# Patient Record
Sex: FEMALE | Race: WHITE | Employment: OTHER | URBAN - METROPOLITAN AREA
[De-identification: names, ages, dates, MRNs, and addresses within clinical notes are randomized per-mention and may not be internally consistent; named-entity substitution may affect disease eponyms.]

---

## 2020-03-10 ENCOUNTER — HOSPITAL ENCOUNTER (EMERGENCY)
Facility: CLINIC | Age: 85
Discharge: HOME | End: 2020-03-11
Attending: EMERGENCY MEDICINE | Admitting: EMERGENCY MEDICINE
Payer: MEDICARE

## 2020-03-10 DIAGNOSIS — I10 HYPERTENSION, UNSPECIFIED TYPE: ICD-10-CM

## 2020-03-10 LAB
BASOPHILS # BLD AUTO: 0 10E9/L (ref 0–0.2)
BASOPHILS NFR BLD AUTO: 0.4 %
DIFFERENTIAL METHOD BLD: NORMAL
EOSINOPHIL # BLD AUTO: 0.1 10E9/L (ref 0–0.7)
EOSINOPHIL NFR BLD AUTO: 0.9 %
ERYTHROCYTE [DISTWIDTH] IN BLOOD BY AUTOMATED COUNT: 13.3 % (ref 10–15)
HCT VFR BLD AUTO: 43.3 % (ref 35–47)
HGB BLD-MCNC: 14.7 G/DL (ref 11.7–15.7)
IMM GRANULOCYTES # BLD: 0 10E9/L (ref 0–0.4)
IMM GRANULOCYTES NFR BLD: 0.3 %
INTERPRETATION ECG - MUSE: NORMAL
LYMPHOCYTES # BLD AUTO: 1.9 10E9/L (ref 0.8–5.3)
LYMPHOCYTES NFR BLD AUTO: 25 %
MCH RBC QN AUTO: 32.2 PG (ref 26.5–33)
MCHC RBC AUTO-ENTMCNC: 33.9 G/DL (ref 31.5–36.5)
MCV RBC AUTO: 95 FL (ref 78–100)
MONOCYTES # BLD AUTO: 0.8 10E9/L (ref 0–1.3)
MONOCYTES NFR BLD AUTO: 10.8 %
NEUTROPHILS # BLD AUTO: 4.7 10E9/L (ref 1.6–8.3)
NEUTROPHILS NFR BLD AUTO: 62.6 %
NRBC # BLD AUTO: 0 10*3/UL
NRBC BLD AUTO-RTO: 0 /100
PLATELET # BLD AUTO: 212 10E9/L (ref 150–450)
RBC # BLD AUTO: 4.56 10E12/L (ref 3.8–5.2)
WBC # BLD AUTO: 7.4 10E9/L (ref 4–11)

## 2020-03-10 PROCEDURE — 99284 EMERGENCY DEPT VISIT MOD MDM: CPT

## 2020-03-10 PROCEDURE — 25000132 ZZH RX MED GY IP 250 OP 250 PS 637: Performed by: EMERGENCY MEDICINE

## 2020-03-10 PROCEDURE — 85025 COMPLETE CBC W/AUTO DIFF WBC: CPT | Performed by: EMERGENCY MEDICINE

## 2020-03-10 PROCEDURE — 80048 BASIC METABOLIC PNL TOTAL CA: CPT | Performed by: EMERGENCY MEDICINE

## 2020-03-10 PROCEDURE — 93005 ELECTROCARDIOGRAM TRACING: CPT

## 2020-03-10 RX ORDER — ACETAMINOPHEN 325 MG/1
650 TABLET ORAL ONCE
Status: COMPLETED | OUTPATIENT
Start: 2020-03-10 | End: 2020-03-10

## 2020-03-10 RX ADMIN — ACETAMINOPHEN 650 MG: 325 TABLET, FILM COATED ORAL at 21:56

## 2020-03-10 ASSESSMENT — ENCOUNTER SYMPTOMS
WEAKNESS: 0
NUMBNESS: 0
SHORTNESS OF BREATH: 0
DIARRHEA: 1
PALPITATIONS: 0
FEVER: 1
SPEECH DIFFICULTY: 0
HEADACHES: 1
LIGHT-HEADEDNESS: 1

## 2020-03-10 ASSESSMENT — MIFFLIN-ST. JEOR: SCORE: 999.73

## 2020-03-10 NOTE — ED AVS SNAPSHOT
Emergency Department  [address] [facility] [address]  Phone:  [phone]  Fax:  [phone]                                    Krupa Mora   MRN: [id number]    Department:   Emergency Department   Date of Visit:  3/10/2020           After Visit Summary Signature Page    I have received my discharge instructions, and my questions have been answered. I have discussed any challenges I see with this plan with the nurse or doctor.    ..........................................................................................................................................  Patient/Patient Representative Signature      ..........................................................................................................................................  Patient Representative Print Name and Relationship to Patient    ..................................................               ................................................  Date                                   Time    ..........................................................................................................................................  Reviewed by Signature/Title    ...................................................              ..............................................  Date                                               Time          22EPIC Rev 08/18

## 2020-03-11 VITALS
OXYGEN SATURATION: 99 % | BODY MASS INDEX: 22.68 KG/M2 | WEIGHT: 128 LBS | TEMPERATURE: 97.8 F | HEIGHT: 63 IN | HEART RATE: 104 BPM | DIASTOLIC BLOOD PRESSURE: 81 MMHG | SYSTOLIC BLOOD PRESSURE: 154 MMHG | RESPIRATION RATE: 16 BRPM

## 2020-03-11 LAB
ANION GAP SERPL CALCULATED.3IONS-SCNC: 6 MMOL/L (ref 3–14)
BUN SERPL-MCNC: 17 MG/DL (ref 7–30)
CALCIUM SERPL-MCNC: 9.6 MG/DL (ref 8.5–10.1)
CHLORIDE SERPL-SCNC: 108 MMOL/L (ref 94–109)
CO2 SERPL-SCNC: 26 MMOL/L (ref 20–32)
CREAT SERPL-MCNC: 0.72 MG/DL (ref 0.52–1.04)
GFR SERPL CREATININE-BSD FRML MDRD: 76 ML/MIN/{1.73_M2}
GLUCOSE SERPL-MCNC: 92 MG/DL (ref 70–99)
POTASSIUM SERPL-SCNC: 3.5 MMOL/L (ref 3.4–5.3)
SODIUM SERPL-SCNC: 140 MMOL/L (ref 133–144)

## 2020-03-11 NOTE — ED TRIAGE NOTES
"\"I feel flushed\" pt reports she started feeling flushed around 1800.  Pt reports 2 days ago she had diarrhea and then it went away. Pt feels pressure to her head.   "

## 2020-03-11 NOTE — ED PROVIDER NOTES
"  History     Chief Complaint:  Flushed      HPI   Krupa Mora is a 84 year old female who presents to the emergency department with her son for evaluation of \"feeling flushed\". Patient reports a sudden onset of pressure on both sides of her head, and feeling \"flushed\" starting 6pm today. Patient also reports intermittent lightheadedness, increased tinnitus, and slightly unsteady gait. She also notes one episode of diarrhea two days ago that has since resolved as she had a normal bowel movement this morning. Son reports that the patient's upstairs neighbor is currently remodeling and making lots of loud noises, which he thinks may be contributing to patient's anxiety.   The patient mentioned several times that she carried several bags of heavy groceries up to her apartment today, which was taxing but not exhausting.  She tries to be active by walking the halls over the winter, but notes she has gained 20 lbs since moving to [place] because of the difficulty in getting outside during the grey.  She denies eating much sodium in her diet.  She denies palpitations, chest pain, shortness of breath, numbness and weakness of her extremities, slurred speech, and vision changes.    Allergies:  Pneumococcal 23-Miranda Ps Vaccine    Medications:    Ipratropium  Atorvastatin  Lorazepam    Past Medical History:    Osteopenia  Insomnia  Anxiety  Depression  Hyperlipidemia  BCC  Melanoma  Dysthymic disorder    Past Surgical History:    Hemorrhoidectomy  Skin cancer removal    Family History:    Parkinsons - father  Uterine cancer - mother    Social History:  Smoking status: never smoker  Alcohol use: yes  Drug use: no  The patient presents to the emergency department her son.  PCP: Yadira Wilcox  Marital Status:       Review of Systems   Constitutional: Positive for fever.   HENT: Positive for tinnitus.    Eyes: Negative for visual disturbance.   Respiratory: Negative for shortness of breath.  " "  Cardiovascular: Negative for chest pain and palpitations.   Gastrointestinal: Positive for diarrhea.   Musculoskeletal: Positive for gait problem.   Neurological: Positive for light-headedness and headaches. Negative for speech difficulty, weakness and numbness.   All other systems reviewed and are negative.      Physical Exam     Patient Vitals for the past 24 hrs:   BP Temp Temp src Pulse Height Weight   03/10/20 2149 (!) 177/88 97.8  F (36.6  C) Temporal 104 1.6 m (5' 3\") 58.1 kg (128 lb)       Physical Exam  Gen: Pleasant, appears stated age.    Eye:   Pupils are equal, round, and reactive.     Sclera non-injected.    Neck: FROM without pain.    ENT:   Moist mucus membranes.     Normal tongue.    Oropharynx without lesions.    Cardiac:     Normal rate and regular rhythm.    No murmurs, gallops, or rubs.    Pulmonary:     Clear to auscultation bilaterally.    No wheezes, rales, or rhonchi.    Abdomen:     Normal active bowel sounds.     Abdomen is soft and non-distended, without focal tenderness.    Musculoskeletal:     Normal movement of all extremities without evidence for deficit.   No temporal tenderness.    Extremities:    No edema.    Skin:   Warm and dry.  Bilateral cheeks with rosacea, slightly flushed.    Neurologic:    Speech is fluent, cognition is normal.     EOMI, symmetric grimace.     Str 5/5 in RUE, LUE, RLE, LLE.     Fine touch sensation intact in BUE/BLE.   Ambulatory with narrow gait though slightly unsteady.    Psychiatric:     Normal affect with appropriate interaction with examiner.      Emergency Department Course   ECG (23:08:30):  Rate 81 bpm. MA interval 140. QRS duration 90. QT/QTc 372/432. P-R-T axes 80 62 16. Normal sinus rhythm. Possible left atrial enlargement. T wave abnormality, consider inferior ischemia. Abnormal ECG. Interpreted at 2313 by Kelsie Rodriguez MD.    Laboratory:  CBC: WBC: 7.4, HGB: 14.7, PLT: 212  BMP: Glucose 92, o/w WNL (Creatinine: " 0.72)    Interventions:  2156 Tylenol 650 mg Oral    Emergency Department Course:  Nursing notes and vitals reviewed. (0857) I performed an exam of the patient as documented above.     Medicine administered as documented above. Blood drawn. This was sent to the lab for further testing, results above.     An EKG was recorded. Results as noted above.     0035 I rechecked the patient and discussed the results of her workup thus far.     Findings and plan explained to the Patient. Patient discharged home with instructions regarding supportive care, medications, and reasons to return. The importance of close follow-up was reviewed.    I personally reviewed the laboratory results with the Patient and answered all related questions prior to discharge.     Impression & Plan    Medical Decision Making:  Krupa Mora is an 84-year-old female who presents with feeling flushed, lightheadedness, and elevated blood pressure.  On exam, the patient is well-appearing with nonfocal neurological exam.  Her presentation is not consistent with stroke.  Blood pressure was markedly elevated upon presentation and improved though still elevated prior to discharge.  At this point, there is no sign of hypertensive emergency including renal failure, acute coronary syndrome, stroke, aortic dissection, heart failure, or other dangerous condition.  She does not have previous diagnosis of hypertension.  At this point, given normal labs, well appearance I think it is appropriate for her to follow-up with primary care physician regarding further evaluation of elevated blood pressure.  Hypertension could be contributing to her's symptom constellation tonight however there could be another process ongoing as well.  I have not detected any dangerous process tonight.  I recommended close follow-up with PCP for recheck of blood pressure and other symptoms in 2 days.  She will return for worsening headache, confusion, vomiting, chest pain, syncope,  or for other concerns.    Diagnosis:    ICD-10-CM    1. Hypertension, unspecified type  I10        Disposition:  discharged to home  Delta Schuster  3/10/2020    EMERGENCY DEPARTMENT  Scribe Disclosure:  I, Delta Schuster, am serving as a scribe at 10:51 PM on 3/10/2020 to document services personally performed by Kelsie Rodriguez MD based on my observations and the provider's statements to me.        Kelsie Rodriguez MD  03/11/20 0157

## 2021-02-01 ENCOUNTER — OFFICE VISIT (OUTPATIENT)
Dept: VASCULAR SURGERY | Facility: CLINIC | Age: 86
End: 2021-02-01
Payer: MEDICARE

## 2021-02-01 DIAGNOSIS — I87.2 VENOUS STASIS DERMATITIS OF RIGHT LOWER EXTREMITY: Primary | ICD-10-CM

## 2021-02-01 DIAGNOSIS — I83.891 VARICOSE VEINS OF LOWER EXTREMITIES WITH COMPLICATIONS, RIGHT: ICD-10-CM

## 2021-02-01 PROCEDURE — 99203 OFFICE O/P NEW LOW 30 MIN: CPT | Performed by: SURGERY

## 2021-02-01 RX ORDER — LORAZEPAM 0.5 MG/1
0.5 TABLET ORAL EVERY 6 HOURS PRN
COMMUNITY

## 2021-02-01 RX ORDER — IPRATROPIUM BROMIDE 42 UG/1
2 SPRAY, METERED NASAL 4 TIMES DAILY
COMMUNITY

## 2021-02-01 RX ORDER — TRIAMCINOLONE ACETONIDE 1 MG/G
CREAM TOPICAL
Status: ON HOLD | COMMUNITY
Start: 2020-12-16 | End: 2023-03-08

## 2021-02-01 RX ORDER — ATORVASTATIN CALCIUM 10 MG/1
10 TABLET, FILM COATED ORAL DAILY
COMMUNITY

## 2021-02-01 NOTE — PROGRESS NOTES
[facility]: Deborah Mora and her son comes to see me today for evaluation of a skin change in her right distal medial ankle in the gaiter distribution.  This very pleasant 85-year-old fairly healthy woman noted changes in her right distal medial calf/ankle area several months ago.  This was not associate with any specific event.  This area was sore.  She saw Dr. Griffin of dermatology.  He was concerned this may be stasis dermatitis.  He recommended 1% triamcinolone lotion twice daily to the area for several weeks.  She did this and there was some slight improvement but has not done this for at least 4 weeks.  He requested that we see her to see if there is any venous etiology.    PMH: Medications: Lipitor, Ativan            Medical: Hyperlipidemia                           No history of CAD, PAD                            Non-smoker.                Lives independently.  Denies any claudication type symptoms.    History of several small varicose veins and spider veins but no other venous issues.  No history of DVT, significant leg swelling.  She did try compression stockings but too difficult to apply in a regular basis.      Exam: Very pleasant talkative 85-year-old woman.  Normal affect.               Chest= clear to auscultation               Cardiovascular= regular rate                  Abdomen= soft, nontender.  No palpable AAA                 Extremities= no significant edema.  Area on her right distal medial calf measuring approximately 6 x 8 cm in diameter that is erythematous and slightly firm.  No evidence at all of cellulitis.  Several scattered areas somewhat similar in the left mid medial calf but much smaller.  No obvious enlarged varicose veins in these areas.  She does have a tortuous very small varicose vein over the right proximal one third tibial region going laterally and several very small ones on the left.  Greater saphenous vein is not dilated at the ankle or calf area  bilaterally.  Spider tones occasional more prominent on the right greater than left thigh.                   +3 palpable dorsalis pedis and popliteal pulses bilaterally.    Impression: #1.  No evidence of PAD with excellent distal pulses and no claudication symptoms.                        #2.   Irritated stasis type dermatitis change in right medial distal calf and ankle.  Did not respond completely to a short course of 1% triamcinolone twice daily cream.  No significant swelling or significant varicosities.  We certainly cannot rule out a venous issue without further evaluation with a duplex ultrasound.  We will evaluate the deep system, greater saphenous veins, and perforators with a bilateral venous duplex ultrasound.  Right leg is of major concern though the left leg has a few similar changes and will be evaluated by ultrasound also.  I somewhat suspect that this is not related to any specific venous issue and discussed this with the patient and her son.                We will have the test performed and will call them with the results.  Until that time and recommend she restart her 1% triamcinolone cream twice daily to the sites to see if it has any improvement.  If we have a totally normal venous exam Dr. Griffin may decide on a more aggressive topical steroid treatment.                 This particular area is more ongoing from this appearance then significant pruritic type symptoms or any type of pain.  She is concerned that this is rubbing on her clothing and her other leg.  She does have cotton stockings that can go above the irritated area that may be protective.  Also discussed the possibility using a low-grade knee-high CEP athletic type stocking for further protection though unlikely she desires this.  I did give her information about purchasing this with her son.      Spent 25 minutes with the patient today.  We will call once the venous duplex testing has been performed.        Sincere Woo  Toni BAE

## 2021-02-01 NOTE — LETTER
2/1/2021         RE: Krupa Mora  [address]  [place] [address]        Dear Colleague,    Thank you for referring your patient, Krupa Mora, to the [facility] [place]. Please see a copy of my visit note below.     [facility]: Deborah Mora and her son comes to see me today for evaluation of a skin change in her right distal medial ankle in the gaiter distribution.  This very pleasant 85-year-old fairly healthy woman noted changes in her right distal medial calf/ankle area several months ago.  This was not associate with any specific event.  This area was sore.  She saw Dr. Griffin of dermatology.  He was concerned this may be stasis dermatitis.  He recommended 1% triamcinolone lotion twice daily to the area for several weeks.  She did this and there was some slight improvement but has not done this for at least 4 weeks.  He requested that we see her to see if there is any venous etiology.    PMH: Medications: Lipitor, Ativan            Medical: Hyperlipidemia                           No history of CAD, PAD                            Non-smoker.                Lives independently.  Denies any claudication type symptoms.    History of several small varicose veins and spider veins but no other venous issues.  No history of DVT, significant leg swelling.  She did try compression stockings but too difficult to apply in a regular basis.      Exam: Very pleasant talkative 85-year-old woman.  Normal affect.               Chest= clear to auscultation               Cardiovascular= regular rate                  Abdomen= soft, nontender.  No palpable AAA                 Extremities= no significant edema.  Area on her right distal medial calf measuring approximately 6 x 8 cm in diameter that is erythematous and slightly firm.  No evidence at all of cellulitis.  Several scattered areas somewhat similar in the left mid medial calf but much smaller.  No  obvious enlarged varicose veins in these areas.  She does have a tortuous very small varicose vein over the right proximal one third tibial region going laterally and several very small ones on the left.  Greater saphenous vein is not dilated at the ankle or calf area bilaterally.  Spider tones occasional more prominent on the right greater than left thigh.                   +3 palpable dorsalis pedis and popliteal pulses bilaterally.    Impression: #1.  No evidence of PAD with excellent distal pulses and no claudication symptoms.                        #2.   Irritated stasis type dermatitis change in right medial distal calf and ankle.  Did not respond completely to a short course of 1% triamcinolone twice daily cream.  No significant swelling or significant varicosities.  We certainly cannot rule out a venous issue without further evaluation with a duplex ultrasound.  We will evaluate the deep system, greater saphenous veins, and perforators with a bilateral venous duplex ultrasound.  Right leg is of major concern though the left leg has a few similar changes and will be evaluated by ultrasound also.  I somewhat suspect that this is not related to any specific venous issue and discussed this with the patient and her son.                We will have the test performed and will call them with the results.  Until that time and recommend she restart her 1% triamcinolone cream twice daily to the sites to see if it has any improvement.  If we have a totally normal venous exam Dr. Griffin may decide on a more aggressive topical steroid treatment.                 This particular area is more ongoing from this appearance then significant pruritic type symptoms or any type of pain.  She is concerned that this is rubbing on her clothing and her other leg.  She does have cotton stockings that can go above the irritated area that may be protective.  Also discussed the possibility using a low-grade knee-high CEP athletic type  stocking for further protection though unlikely she desires this.  I did give her information about purchasing this with her son.      Spent 25 minutes with the patient today.  We will call once the venous duplex testing has been performed.        Sincere Muñoz MD      Again, thank you for allowing me to participate in the care of your patient.        Sincerely,        Sincere Muñoz

## 2021-02-11 ENCOUNTER — ANCILLARY PROCEDURE (OUTPATIENT)
Dept: ULTRASOUND IMAGING | Facility: CLINIC | Age: 86
End: 2021-02-11
Attending: SURGERY
Payer: MEDICARE

## 2021-02-11 DIAGNOSIS — I87.2 VENOUS STASIS DERMATITIS OF RIGHT LOWER EXTREMITY: ICD-10-CM

## 2021-02-11 DIAGNOSIS — I83.891 VARICOSE VEINS OF LOWER EXTREMITIES WITH COMPLICATIONS, RIGHT: ICD-10-CM

## 2021-02-11 PROCEDURE — 93970 EXTREMITY STUDY: CPT | Performed by: SURGERY

## 2021-02-15 ENCOUNTER — VIRTUAL VISIT (OUTPATIENT)
Dept: VASCULAR SURGERY | Facility: CLINIC | Age: 86
End: 2021-02-15
Attending: SURGERY
Payer: MEDICARE

## 2021-02-15 DIAGNOSIS — L30.9 DERMATITIS: Primary | ICD-10-CM

## 2021-02-15 PROCEDURE — 99442 PR PHYSICIAN TELEPHONE EVALUATION 11-20 MIN: CPT | Performed by: SURGERY

## 2021-02-15 NOTE — PROGRESS NOTES
Per Dr. Muñoz, mailed home a copy of his note to pt, and faxed copy to Dermatologist Dr. Jovan Griffin at [phone].     ИРИНА Blake

## 2021-02-15 NOTE — LETTER
2/15/2021         RE: rKupa Mora  [address]  [place] [address]        Dear Colleague,    Thank you for referring your patient, Krupa Mora, to the [facility] [place]. Please see a copy of my visit note below.     [facility]: Deborah Mora has noticed skin changes in her right distal medial ankle in the gaiter distribution that has been present for several months.  Evaluated by Dr. Griffin who applied several topical steroids with no improvement.  This area measured approximately 6 x 8 cm.  No evidence of arterial insufficiency or obvious dilated varicose veins.  Spider veins were noted but not felt related to this issue.    We wanted to rule out a venous etiology.  Underwent bilateral venous duplex on 2/11/2021.  Mild deep venous insufficiency involving the right common femoral vein and the left common femoral/proximal and mid superficial femoral veins but the rest were normal and this is not clinically significant.  No DVT.  Greater and lesser and accessory saphenous veins bilaterally were competent.    These were not overly dilated with the right GSV 3.2 mm in the proximal calf and left 4.3 mm in the proximal thigh.    TELEPHONE CONSULT:    I spoke with the patient and her son on the phone today.  She reports that she is doing well.  She is using the triamcinolone cream twice daily.  This does help to some degree but has not resolved the problem.  He essentially has no pain at the site except for every once in a while they will be a twinge that goes away almost immediately when she shifts her weight.  She does notice at the end of the day that some of the skin is sloughing off at the site of the irritation.      I discussed the findings of the venous duplex.  There is mild venous insufficiency more on the left than right leg involving the deep veins of no clinical significance at all since the distal superficial femoral and popliteal  veins are competent.  Also no superficial venous incompetence.  Thus I do not feel this is related to venous insufficiency nor any type of surgery would give her any benefit.    I recommended she see Dr Griffin again for reevaluation.  He perhaps may want a rectal much more potent steroid topical cream or something different.  Until that time I would continue with the twice daily triamcinolone steroid and a very low-grade compression.    We spent 12 minutes on the phone today from 1354 to 1410 hours    No specific vascular/venous follow-up is indicated.      Sincere Muñoz MD    CC;  Dr Jovan Griffin Dermatology    Krupa is a 85 year old who is being evaluated via a billable telephone visit.      What phone number would you like to be contacted at? [phone]            Phone call duration: 12 minutes      Again, thank you for allowing me to participate in the care of your patient.        Sincerely,        Sincere Muñoz

## 2021-02-15 NOTE — PROGRESS NOTES
Krupa is a 85 year old who is being evaluated via a billable telephone visit.      What phone number would you like to be contacted at? [phone]            Phone call duration: 12 minutes

## 2021-02-15 NOTE — PROGRESS NOTES
[facility]: Deborah Mora has noticed skin changes in her right distal medial ankle in the gaiter distribution that has been present for several months.  Evaluated by Dr. Griffin who applied several topical steroids with no improvement.  This area measured approximately 6 x 8 cm.  No evidence of arterial insufficiency or obvious dilated varicose veins.  Spider veins were noted but not felt related to this issue.    We wanted to rule out a venous etiology.  Underwent bilateral venous duplex on 2/11/2021.  Mild deep venous insufficiency involving the right common femoral vein and the left common femoral/proximal and mid superficial femoral veins but the rest were normal and this is not clinically significant.  No DVT.  Greater and lesser and accessory saphenous veins bilaterally were competent.    These were not overly dilated with the right GSV 3.2 mm in the proximal calf and left 4.3 mm in the proximal thigh.    TELEPHONE CONSULT:    I spoke with the patient and her son on the phone today.  She reports that she is doing well.  She is using the triamcinolone cream twice daily.  This does help to some degree but has not resolved the problem.  He essentially has no pain at the site except for every once in a while they will be a twinge that goes away almost immediately when she shifts her weight.  She does notice at the end of the day that some of the skin is sloughing off at the site of the irritation.      I discussed the findings of the venous duplex.  There is mild venous insufficiency more on the left than right leg involving the deep veins of no clinical significance at all since the distal superficial femoral and popliteal veins are competent.  Also no superficial venous incompetence.  Thus I do not feel this is related to venous insufficiency nor any type of surgery would give her any benefit.    I recommended she see Dr Griffin again for reevaluation.  He perhaps may want a rectal  much more potent steroid topical cream or something different.  Until that time I would continue with the twice daily triamcinolone steroid and a very low-grade compression.    We spent 12 minutes on the phone today from 1354 to 1410 hours    No specific vascular/venous follow-up is indicated.      Sincere Muñoz MD    CC;  Dr Jovan Griffin Dermatology

## 2023-01-11 ENCOUNTER — TRANSFERRED RECORDS (OUTPATIENT)
Dept: HEALTH INFORMATION MANAGEMENT | Facility: CLINIC | Age: 88
End: 2023-01-11
Payer: MEDICARE

## 2023-01-19 ENCOUNTER — TELEPHONE (OUTPATIENT)
Dept: SURGERY | Facility: CLINIC | Age: 88
End: 2023-01-19

## 2023-01-19 ENCOUNTER — OFFICE VISIT (OUTPATIENT)
Dept: SURGERY | Facility: CLINIC | Age: 88
End: 2023-01-19
Payer: MEDICARE

## 2023-01-19 VITALS
HEART RATE: 106 BPM | RESPIRATION RATE: 14 BRPM | WEIGHT: 115.8 LBS | HEIGHT: 63 IN | SYSTOLIC BLOOD PRESSURE: 128 MMHG | OXYGEN SATURATION: 97 % | BODY MASS INDEX: 20.52 KG/M2 | DIASTOLIC BLOOD PRESSURE: 62 MMHG

## 2023-01-19 DIAGNOSIS — C80.1 MICROPAPILLARY CARCINOMA (H): ICD-10-CM

## 2023-01-19 PROCEDURE — 99205 OFFICE O/P NEW HI 60 MIN: CPT | Performed by: SURGERY

## 2023-01-19 NOTE — TELEPHONE ENCOUNTER
Type of surgery: RFID tag localized right breast lumpectomy  Location of surgery: [facility]  Date and time of surgery: 2/15/23 12:25pm  Surgeon: Dr Calvillo  Pre-Op Appt Date: pt to schedule  Post-Op Appt Date: pt to schedule   Packet sent out: Yes  Pre-cert/Authorization completed:  Not Applicable  Date: 1/19/23

## 2023-01-19 NOTE — LETTER
"January 23, 2023          Alexa Ashford  [facility]  [address] [place] [address]      RE:   Krupa Mora 1935      Dear Colleague,    Thank you for referring your patient, Krupa Mora, to [facility] at [facility]. Please see a copy of my visit note below.     Krupa Mora is a 87 year old female who presented with new findings in the right breast.  Patient had not been having regular mammograms for many years but was recently seen by her primary care doctor.  There was some concern for a lump in the right breast.  This had been her first breast exam in 2 years.  Patient was not complaining of any skin thickening or nipple discharge.  No pain.  She was sent for diagnostic mammography and ultrasound which revealed a suspicious lesion in the right breast, smaller than 2 cm.  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  No evidence of lymph node involvement.  Patient has no previous history of breast disease or breast cancer.  No significant family history of breast cancer.  She lives alone and is fairly independent.  She is here to discuss her new diagnosis.     PMH:  Krupa Mora  has no past medical history on file.  PSH:  Krupa Mora  has no past surgical history on file.     Home medications and allergies reviewed.     Social History:  Krupa Mora  reports that she has never smoked. She has never used smokeless tobacco.  Family History:  Krupa Mora family history is not on file.     ROS:  The 10 point Review of Systems is negative other than noted in the HPI.  No fevers or chills.  No recent weight loss or weight gain.     Physical Exam:  Blood pressure 128/62, pulse 106, resp. rate 14, height 1.6 m (5' 3\"), weight 52.5 kg (115 lb 12.8 oz), SpO2 97 %.  115 lbs 12.8 oz  Thin, well-appearing older female in no distress.  Patient has a pleasant affect and " communicates well.   Pupils equal round and reactive to light.   No cervical lymphadenopathy or thyromegaly.   Lung fields clear, breathing comfortably.   Heart normal sinus rhythm.  No murmurs rubs or gallops.  Bilateral breast exam performed.  Moderate bruising in the right breast with a palpable 2 cm lesion.  Moves appropriately, not tethered to the underlying chest wall.  No axillary lymphadenopathy.  Moderately dense breasts bilaterally, no left-sided breast lesions.  Skin warm, dry.  No obvious rashes or lesions.     All new lab and imaging data was reviewed.  This includes personal review of outside clinic notes, pathology reports, and personal review of mammographic images.      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  This is a relatively small lesion with no evidence for lymph node involvement.  I have initially recommended an MRI but the patient has expressed her desire to avoid this if possible.  I do not think an MRI is mandatory, as her mammographic images are fairly clear.  I have questions about optimal treatment for her, given her advanced age.  I think it would be possible to avoid lymph node biopsy but I wanted her to be evaluated by medical oncology first.  Also think that avoiding radiation is a consideration but given the patient's relatively good health, I think she does have a risk for breast cancer recurrence in her lifetime.  I will touch base with the patient's oncologist once they have established care and the tentative plan will be for lumpectomy with or without lymph node biopsy.  Surgical co-morbities include hypertension, osteoarthritis, asthma, hyperlipidemia.         Again, thank you for allowing me to participate in the care of your patient.      Sincerely,      Basil Calvillo MD

## 2023-01-23 ENCOUNTER — CARE COORDINATION (OUTPATIENT)
Dept: SURGERY | Facility: CLINIC | Age: 88
End: 2023-01-23
Payer: MEDICARE

## 2023-01-23 NOTE — PROGRESS NOTES
Krupa is scheduled with Dr. Kraft on 2/1/23 @ 1:45pm. Dr. Kraft is okay with doing her pre-op exam at that appointment for her upcoming surgery with Dr. Calvillo on 2/15/23.     Lamar Hutton, RN on 1/23/2023 at 12:46 PM  Lamar Hutton, RN, BSN, PHN  Breast Care Nurse Coordinator  [facility] [place] [place]  [phone]

## 2023-01-23 NOTE — PROGRESS NOTES
"Surgery Consultation, [facility]         Basil Calvillo MD, MD    Krupa Mora MRN# [id number]   YOB: 1935 Age: 87 year old     PCP:  Yadira Wilcox [phone]    Chief Complaint: New diagnosis micropapillary carcinoma    Pt was seen in consultation from Yadira Wilcox.    History of Present Illness:  Krupa Mora is a 87 year old female who presented with new findings in the right breast.  Patient had not been having regular mammograms for many years but was recently seen by her primary care doctor.  There was some concern for a lump in the right breast.  This had been her first breast exam in 2 years.  Patient was not complaining of any skin thickening or nipple discharge.  No pain.  She was sent for diagnostic mammography and ultrasound which revealed a suspicious lesion in the right breast, smaller than 2 cm.  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  This was HER2 negative.  No evidence of lymph node involvement.  Patient has no previous history of breast disease or breast cancer.  No significant family history of breast cancer.  She lives alone and is fairly independent.  She is here to discuss her new diagnosis.    PMH:  Krupa Mora  has no past medical history on file.  PSH:  Krupa Mora  has no past surgical history on file.    Home medications and allergies reviewed.    Social History:  Krupa Mora  reports that she has never smoked. She has never used smokeless tobacco.  Family History:  Krupa Mora family history is not on file.    ROS:  The 10 point Review of Systems is negative other than noted in the HPI.  No fevers or chills.  No recent weight loss or weight gain.    Physical Exam:  Blood pressure 128/62, pulse 106, resp. rate 14, height 1.6 m (5' 3\"), weight 52.5 kg (115 lb 12.8 oz), SpO2 97 %.  115 lbs 12.8 oz  Thin, well-appearing older female in no " distress.  Patient has a pleasant affect and communicates well.   Pupils equal round and reactive to light.   No cervical lymphadenopathy or thyromegaly.   Lung fields clear, breathing comfortably.   Heart normal sinus rhythm.  No murmurs rubs or gallops.  Bilateral breast exam performed.  Moderate bruising in the right breast with a palpable 2 cm lesion.  Moves appropriately, not tethered to the underlying chest wall.  No axillary lymphadenopathy.  Moderately dense breasts bilaterally, no left-sided breast lesions.  Skin warm, dry.  No obvious rashes or lesions.    All new lab and imaging data was reviewed.  This includes personal review of outside clinic notes, pathology reports, and personal review of mammographic images.     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  This is a relatively small lesion with no evidence for lymph node involvement.  I have initially recommended an MRI but the patient has expressed her desire to avoid this if possible.  I do not think an MRI is mandatory, as her mammographic images are fairly clear.  I have questions about optimal treatment for her, given her advanced age.  I think it would be possible to avoid lymph node biopsy but I wanted her to be evaluated by medical oncology first.  Also think that avoiding radiation is a consideration but given the patient's relatively good health, I think she does have a risk for breast cancer recurrence in her lifetime.  I will touch base with the patient's oncologist once they have established care and the tentative plan will be for lumpectomy with or without lymph node biopsy.  Surgical co-morbities include hypertension, osteoarthritis, asthma, hyperlipidemia.    Jamir Calvillo M.D.  [facility]  [phone]    Please route or send letter to:  Primary Care Provider (PCP) and Referring Provider

## 2023-01-26 NOTE — PROGRESS NOTES
RECORDS STATUS - ALL OTHER DIAGNOSIS    Micropapillary carcinoma (H)  RECORDS RECEIVED FROM: [facility]/ [facility] / Yadira   DATE RECEIVED: 2/1/2023    Action    Action Taken 1/26/2023 10:13AM KOURTNEY   I faxed over a request for path slides to Yadira    I called pt Krupa - unavailable. I tried calling a second time.. no recs at [facility]      NOTES STATUS DETAILS   OFFICE NOTE from referring provider Complete Basil Fuller MD   DISCHARGE SUMMARY from hospital     DISCHARGE REPORT from the ER     OPERATIVE REPORT Complete See Biopsy in EPIC   MEDICATION LIST Complete [facility]   CLINICAL TRIAL TREATMENTS TO DATE     LABS     PATHOLOGY REPORTS Requested- Yadira Cook Tracking Number: [id number] 1/11/2023  Case: [id number]                                  A) RIGHT BREAST, 12:00, 7 CM FROM NIPPLE, ULTRASOUND-GUIDED CORE BIOPSY:   1. Invasive ductal carcinoma    ANYTHING RELATED TO DIAGNOSIS     GENONOMIC TESTING     TYPE:     IMAGING (NEED IMAGES & REPORT)     CT SCANS     MRI     MAMMO Complete- [facility]  Mammo 1/11/2023 more in PACS   ULTRASOUND Complete- [facility]  US Breast 1/11/2023   PET

## 2023-02-01 ENCOUNTER — ONCOLOGY VISIT (OUTPATIENT)
Dept: ONCOLOGY | Facility: CLINIC | Age: 88
End: 2023-02-01
Attending: SURGERY
Payer: MEDICARE

## 2023-02-01 ENCOUNTER — PRE VISIT (OUTPATIENT)
Dept: ONCOLOGY | Facility: CLINIC | Age: 88
End: 2023-02-01
Payer: MEDICARE

## 2023-02-01 VITALS
WEIGHT: 110.2 LBS | TEMPERATURE: 97.6 F | HEART RATE: 98 BPM | RESPIRATION RATE: 16 BRPM | HEIGHT: 63 IN | DIASTOLIC BLOOD PRESSURE: 90 MMHG | SYSTOLIC BLOOD PRESSURE: 157 MMHG | OXYGEN SATURATION: 98 % | BODY MASS INDEX: 19.53 KG/M2

## 2023-02-01 DIAGNOSIS — C80.1 MICROPAPILLARY CARCINOMA (H): ICD-10-CM

## 2023-02-01 PROCEDURE — 93010 ELECTROCARDIOGRAM REPORT: CPT | Performed by: INTERNAL MEDICINE

## 2023-02-01 PROCEDURE — 99205 OFFICE O/P NEW HI 60 MIN: CPT | Performed by: INTERNAL MEDICINE

## 2023-02-01 PROCEDURE — G0463 HOSPITAL OUTPT CLINIC VISIT: HCPCS | Performed by: INTERNAL MEDICINE

## 2023-02-01 ASSESSMENT — PAIN SCALES - GENERAL: PAINLEVEL: NO PAIN (0)

## 2023-02-01 NOTE — PROGRESS NOTES
[facility]    Hematology/Oncology New Patient Note      Today's Date: 02/01/23    Reason for Consult: Right breast cancer       HISTORY OF PRESENT ILLNESS: Krupa Mora is a 87 year old female who presents with the following oncology history    1.  Palpable lump in the right breast by PCP  2.Diagnostic mammogram and ultrasound showed a suspicious lesion in the right breast approximately 2 cm  3.  Biopsy obtained she was found to have micropapillary carcinoma ER/DE positive HER2/logan negative lymph node involvement  4.  No significant family history of breast cancer      REVIEW OF SYSTEMS:   14 point ROS was reviewed and is negative other than as noted above in HPI.       HOME MEDICATIONS:  Current Outpatient Medications   Medication Sig Dispense Refill     atorvastatin (LIPITOR) 10 MG tablet Take 10 mg by mouth daily       calcium citrate-vitamin D (CITRACAL) 315-250 MG-UNIT TABS per tablet Take by mouth 2 times daily       ipratropium (ATROVENT) 0.06 % nasal spray Spray 2 sprays into both nostrils 4 times daily       LORazepam (ATIVAN) 0.5 MG tablet Take 0.5 mg by mouth every 6 hours as needed for anxiety       Sennosides (SENOKOT PO)        Simethicone (GAS-X PO)        triamcinolone (KENALOG) 0.1 % external cream APPLY TWICE DAILY TO RASH/MONTH AS NEEDED. (Patient not taking: Reported on 1/19/2023)           ALLERGIES:  Allergies   Allergen Reactions     Pneumococcal 13-Miranda Conj Vacc Rash         PAST MEDICAL HISTORY:  Hyperlipidemia    PAST SURGICAL HISTORY:  No past surgical history on file.      SOCIAL HISTORY:  Social History     Socioeconomic History     Marital status:      Spouse name: Not on file     Number of children: Not on file     Years of education: Not on file     Highest education level: Not on file   Occupational History     Not on file   Tobacco Use     Smoking status: Never     Smokeless tobacco: Never   Substance and Sexual Activity     Alcohol use: Not  "on file     Drug use: Not on file     Sexual activity: Not on file   Other Topics Concern     Parent/sibling w/ CABG, MI or angioplasty before 65F 55M? Not Asked   Social History Narrative     Not on file     Social Determinants of Health     Financial Resource Strain: Not on file   Food Insecurity: Not on file   Transportation Needs: Not on file   Physical Activity: Not on file   Stress: Not on file   Social Connections: Not on file   Intimate Partner Violence: Not on file   Housing Stability: Not on file         FAMILY HISTORY:  No Family history of breast cancer, ovarian cancer pancreatic cancer    PHYSICAL EXAM:  Vital signs:  BP (!) 157/90   Pulse 98   Temp 97.6  F (36.4  C) (Oral)   Resp 16   Ht 1.588 m (5' 2.5\")   Wt 50 kg (110 lb 3.2 oz)   SpO2 98%   BMI 19.83 kg/m     ECO  GENERAL/CONSTITUTIONAL: No acute distress.  EYES: Pupils are equal, round, and react to light and accommodation. Extraocular movements intact.  No scleral icterus.  ENT/MOUTH: Neck supple. Oropharynx clear, no mucositis.  LYMPH: No anterior cervical, posterior cervical, supraclavicular, axillary or inguinal adenopathy.   RESPIRATORY: Clear to auscultation bilaterally. No crackles or wheezing.   CARDIOVASCULAR: Regular rate and rhythm without murmurs, gallops, or rubs.  GASTROINTESTINAL: No hepatosplenomegaly, masses, or tenderness. The patient has normal bowel sounds. No guarding.  No distention.  MUSCULOSKELETAL: Warm and well-perfused, no cyanosis, clubbing, or edema.  NEUROLOGIC: Cranial nerves II-XII are intact. Alert, oriented, answers questions appropriately.  INTEGUMENTARY: No rashes or jaundice.  GAIT: Steady, does not use assistive device  Breast right breast palpable 2.2 cm lesion at approximately 9 o'clock position 8 cm from the nipple no axillary adenopathy.  Left breast normal      LABS:  CBC RESULTS:       PATHOLOGY:  Most recent CBC from 2022 reviewed    IMAGING:  Noted mammogram and " ultrasound    ASSESSMENT/PLAN:  Krupa Mora is a 87 year old female with a new diagnosis of breast cancer early-stage ER/IL positive HER2 negative    Had additional discussion with the patient about endocrine therapy,  surgery and radiation.  After detailed discussion it appears patient is interested in moving forward with surgery for consideration of abbreviated radiation however she is not interested in systemic therapy such as aromatase inhibitors or tamoxifen due to reported side effects.Based on her age and performance status   I think it is reasonable      1.  Breast cancer: Proceed with surgery, medically cleared for surgery. Agree with omitting sentinel node biopsy as it will not change her treatment or outcome    2 see me back after surgery completed for surveillance and further discussion       Total time spent on day of visit, including review of tests, obtaining/reviewing separately obtained history, ordering medications/tests/procedures, communicating with PCP/consultants, and documenting in electronic medical record: 65 minutes        Winston Kraft MD  Hematology/Oncology  [facility]

## 2023-02-01 NOTE — LETTER
"    2/1/2023         RE: Krupa Mora  [address]  [facility] [facility] [address]        Dear Colleague,    Thank you for referring your patient, Krupa Mora, to the [facility]. Please see a copy of my visit note below.    Oncology Rooming Note    February 1, 2023 1:59 PM   Krupa Mora is a 87 year old female who presents for:    Chief Complaint   Patient presents with     Oncology Clinic Visit     Initial Vitals: There were no vitals taken for this visit. Estimated body mass index is 20.51 kg/m  as calculated from the following:    Height as of 1/19/23: 1.6 m (5' 3\").    Weight as of 1/19/23: 52.5 kg (115 lb 12.8 oz). There is no height or weight on file to calculate BSA.  Data Unavailable Comment: Data Unavailable   No LMP recorded.  Allergies reviewed: Yes  Medications reviewed: Yes    Medications: Medication refills not needed today.  Pharmacy name entered into [employer]: [id number] - [place], [place] - [address]    Clinical concerns:  doctor was notified.      Selene Nogueira [facility]    Hematology/Oncology New Patient Note      Today's Date: 02/01/23    Reason for Consult: Right breast cancer       HISTORY OF PRESENT ILLNESS: Krupa Mora is a 87 year old female who presents with the following oncology history    1.  Palpable lump in the right breast by PCP  2.Diagnostic mammogram and ultrasound showed a suspicious lesion in the right breast approximately 2 cm  3.  Biopsy obtained she was found to have micropapillary carcinoma ER/ND positive HER2/logan negative lymph node involvement  4.  No significant family history of breast cancer      REVIEW OF SYSTEMS:   14 point ROS was reviewed and is negative other than as noted above in HPI.       HOME MEDICATIONS:  Current Outpatient Medications   Medication Sig Dispense Refill     atorvastatin (LIPITOR) 10 MG tablet Take 10 mg by mouth daily       calcium " "citrate-vitamin D (CITRACAL) 315-250 MG-UNIT TABS per tablet Take by mouth 2 times daily       ipratropium (ATROVENT) 0.06 % nasal spray Spray 2 sprays into both nostrils 4 times daily       LORazepam (ATIVAN) 0.5 MG tablet Take 0.5 mg by mouth every 6 hours as needed for anxiety       Sennosides (SENOKOT PO)        Simethicone (GAS-X PO)        triamcinolone (KENALOG) 0.1 % external cream APPLY TWICE DAILY TO RASH/MONTH AS NEEDED. (Patient not taking: Reported on 2023)           ALLERGIES:  Allergies   Allergen Reactions     Pneumococcal 13-Miranda Conj Vacc Rash         PAST MEDICAL HISTORY:  Hyperlipidemia    PAST SURGICAL HISTORY:  No past surgical history on file.      SOCIAL HISTORY:  Social History     Socioeconomic History     Marital status:      Spouse name: Not on file     Number of children: Not on file     Years of education: Not on file     Highest education level: Not on file   Occupational History     Not on file   Tobacco Use     Smoking status: Never     Smokeless tobacco: Never   Substance and Sexual Activity     Alcohol use: Not on file     Drug use: Not on file     Sexual activity: Not on file   Other Topics Concern     Parent/sibling w/ CABG, MI or angioplasty before 65F 55M? Not Asked   Social History Narrative     Not on file     Social Determinants of Health     Financial Resource Strain: Not on file   Food Insecurity: Not on file   Transportation Needs: Not on file   Physical Activity: Not on file   Stress: Not on file   Social Connections: Not on file   Intimate Partner Violence: Not on file   Housing Stability: Not on file         FAMILY HISTORY:  No Family history of breast cancer, ovarian cancer pancreatic cancer    PHYSICAL EXAM:  Vital signs:  BP (!) 157/90   Pulse 98   Temp 97.6  F (36.4  C) (Oral)   Resp 16   Ht 1.588 m (5' 2.5\")   Wt 50 kg (110 lb 3.2 oz)   SpO2 98%   BMI 19.83 kg/m     ECO  GENERAL/CONSTITUTIONAL: No acute distress.  EYES: Pupils are equal, " round, and react to light and accommodation. Extraocular movements intact.  No scleral icterus.  ENT/MOUTH: Neck supple. Oropharynx clear, no mucositis.  LYMPH: No anterior cervical, posterior cervical, supraclavicular, axillary or inguinal adenopathy.   RESPIRATORY: Clear to auscultation bilaterally. No crackles or wheezing.   CARDIOVASCULAR: Regular rate and rhythm without murmurs, gallops, or rubs.  GASTROINTESTINAL: No hepatosplenomegaly, masses, or tenderness. The patient has normal bowel sounds. No guarding.  No distention.  MUSCULOSKELETAL: Warm and well-perfused, no cyanosis, clubbing, or edema.  NEUROLOGIC: Cranial nerves II-XII are intact. Alert, oriented, answers questions appropriately.  INTEGUMENTARY: No rashes or jaundice.  GAIT: Steady, does not use assistive device  Breast right breast palpable 2.2 cm lesion at approximately 9 o'clock position 8 cm from the nipple no axillary adenopathy.  Left breast normal      LABS:  CBC RESULTS:       PATHOLOGY:  Most recent CBC from 12/21/2022 reviewed    IMAGING:  Noted mammogram and ultrasound    ASSESSMENT/PLAN:  Krupa Mora is a 87 year old female with a new diagnosis of breast cancer early-stage ER/VT positive HER2 negative    Had additional discussion with the patient about endocrine therapy,  surgery and radiation.  After detailed discussion it appears patient is interested in moving forward with surgery for consideration of abbreviated radiation however she is not interested in systemic therapy such as aromatase inhibitors or tamoxifen due to reported side effects.Based on her age and performance status   I think it is reasonable      1.  Breast cancer: Proceed with surgery, medically cleared for surgery. Agree with omitting sentinel node biopsy as it will not change her treatment or outcome    2 see me back after surgery completed for surveillance and further discussion       Total time spent on day of visit, including review of tests,  obtaining/reviewing separately obtained history, ordering medications/tests/procedures, communicating with PCP/consultants, and documenting in electronic medical record: 65 minutes        Winston Kraft MD  Hematology/Oncology  [facility]      Again, thank you for allowing me to participate in the care of your patient.        Sincerely,        Winston Kraft MD

## 2023-02-01 NOTE — PROGRESS NOTES
"Oncology Rooming Note    February 1, 2023 1:59 PM   Krupa Mora is a 87 year old female who presents for:    Chief Complaint   Patient presents with     Oncology Clinic Visit     Initial Vitals: There were no vitals taken for this visit. Estimated body mass index is 20.51 kg/m  as calculated from the following:    Height as of 1/19/23: 1.6 m (5' 3\").    Weight as of 1/19/23: 52.5 kg (115 lb 12.8 oz). There is no height or weight on file to calculate BSA.  Data Unavailable Comment: Data Unavailable   No LMP recorded.  Allergies reviewed: Yes  Medications reviewed: Yes    Medications: Medication refills not needed today.  Pharm[facility] into [id number] [place] [id number]    Clinical concerns:  doctor was notified.      Selene Nogueira [facility]            "

## 2023-02-02 NOTE — TELEPHONE ENCOUNTER
Action February 2, 2023 10:43 AM ABT   Action Taken Slides from Yadira received and taken to 5th floor path lab for review.    01/11/23: [id number]

## 2023-02-13 ENCOUNTER — TELEPHONE (OUTPATIENT)
Dept: SURGERY | Facility: CLINIC | Age: 88
End: 2023-02-13
Payer: MEDICARE

## 2023-02-13 NOTE — TELEPHONE ENCOUNTER
Breast Care Nurse Coordination:      Preoperative call placed to Krupa today to assess her needs, and check in on whether she has any questions or concerns regarding her upcoming breast surgery.    Patient was recently diagnosed with right breast cancer and is scheduled to have a right breast lumpectomy by Dr. Calvillo on 2/15/23 at the [facility].    I called Krupa to discuss what the day of surgery will look like. However, when I called her she requested to cancel the surgery as she felt it was being rushed. She had extensive questions for Dr. Calvillo and would like to come in to see him again. She is scheduled for 2/14/23 @ 11:15am to discuss her concerns at [facility]. Address & Directions provided.     Krupa had a preop physical exam with Dr. Kraft on 2/1/23. She is aware that by canceling, she may need to have another pre-op done by her primary care provider, as we may not be able to get her in within 30 days of that date.     Message sent to Kitty to cancel Krupa's surgery. I will route this message to Dr. Calvillo as well to inform.      Krupa had extensive questions regarding the pre surgical scrub. Questions addressed. Pt verbalized understanding.     Lamar Hutton, RN, BSN, PHN  Breast Care Nurse Coordinator  [facility] [place] [place]  [phone]

## 2023-02-14 ENCOUNTER — ANESTHESIA EVENT (OUTPATIENT)
Dept: SURGERY | Facility: CLINIC | Age: 88
End: 2023-02-14
Payer: MEDICARE

## 2023-02-14 ENCOUNTER — OFFICE VISIT (OUTPATIENT)
Dept: SURGERY | Facility: CLINIC | Age: 88
End: 2023-02-14
Payer: MEDICARE

## 2023-02-14 ENCOUNTER — MEDICAL CORRESPONDENCE (OUTPATIENT)
Dept: HEALTH INFORMATION MANAGEMENT | Facility: CLINIC | Age: 88
End: 2023-02-14

## 2023-02-14 VITALS
HEIGHT: 60 IN | BODY MASS INDEX: 21.6 KG/M2 | OXYGEN SATURATION: 98 % | DIASTOLIC BLOOD PRESSURE: 80 MMHG | SYSTOLIC BLOOD PRESSURE: 122 MMHG | RESPIRATION RATE: 16 BRPM | WEIGHT: 110 LBS

## 2023-02-14 DIAGNOSIS — C80.1 MICROPAPILLARY CARCINOMA (H): Primary | ICD-10-CM

## 2023-02-14 PROCEDURE — 99214 OFFICE O/P EST MOD 30 MIN: CPT | Performed by: SURGERY

## 2023-02-14 NOTE — NURSING NOTE
Breast Nurse Care Coordination:      I accompanied Krupa and her son, Todd, to her surgical consultation follow up on 2/14/23 with Dr. Calvillo at the [facility] [place].     After discussing her questions with Dr. Calvillo, Krupa feels she is ready to schedule surgery again. She is scheduled for a lumpectomy on 3/8/23 with Dr. Calvillo at the [facility]. We discussed what the day of surgery will look like, as well as recovery. She has been having difficulty with finding a bra to wear after surgery. I will send a prescription over to [employer]. She has another store she will try first, but still wants me to send a prescription to [employer]. Krupa is aware that she will need another pre-op exam with her primary care provider before surgery.      I answered her questions and encouraged patient to call me back with any future questions or concerns.  Krupa has my contact information, and knows to contact me in the future with any questions or concerns.     Lamar Hutton 2/14/2023 2:18 PM    Lamar Hutton, RN, BSN, PHN  Breast Care Nurse Coordinator  [facility] [place]  [phone]

## 2023-02-15 ENCOUNTER — ANESTHESIA (OUTPATIENT)
Dept: SURGERY | Facility: CLINIC | Age: 88
End: 2023-02-15
Payer: MEDICARE

## 2023-02-16 NOTE — PROGRESS NOTES
Surgery follow-up note  Krupa returns with her son to discuss surgery.  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  This is a palpable lesion with no evidence for lymph node involvement.  I had originally recommended MRI but she wished to avoid this if possible.  She was scheduled for surgery but had questions about the necessity of this invasive procedure.  I explained that we were hoping to minimize the likelihood for breast cancer complications or recurrence but planned to do this in a way which would minimize complications.  I still feel that surgery with potentially limited radiation would accomplish this.  Patient's questions were answered, we will get her rescheduled for seed localized lumpectomy in the near future.  Do not plan on performing lymph node biopsy.  Jamir Calvillo MD  General Surgery, Office [phone]

## 2023-03-06 ENCOUNTER — HOSPITAL ENCOUNTER (OUTPATIENT)
Dept: MAMMOGRAPHY | Facility: CLINIC | Age: 88
Discharge: HOME | End: 2023-03-06
Attending: SURGERY
Payer: MEDICARE

## 2023-03-06 DIAGNOSIS — C80.1 MICROPAPILLARY CARCINOMA (H): ICD-10-CM

## 2023-03-06 PROCEDURE — 999N000065 MA POST PROCEDURE RIGHT

## 2023-03-06 PROCEDURE — 250N000009 HC RX 250: Performed by: RADIOLOGY

## 2023-03-06 PROCEDURE — A4648 IMPLANTABLE TISSUE MARKER: HCPCS

## 2023-03-06 RX ADMIN — LIDOCAINE HYDROCHLORIDE 5 ML: 10 INJECTION, SOLUTION INFILTRATION; PERINEURAL at 14:10

## 2023-03-07 ENCOUNTER — TELEPHONE (OUTPATIENT)
Dept: SURGERY | Facility: CLINIC | Age: 88
End: 2023-03-07
Payer: MEDICARE

## 2023-03-07 NOTE — ANESTHESIA PREPROCEDURE EVALUATION
Anesthesia Pre-Procedure Evaluation    Patient: Krupa Mora   MRN: [id number] : 1935        Procedure : Procedure(s):  Radio Frequency Identification localized right lumpectomy          No past medical history on file.   No past surgical history on file.   Allergies   Allergen Reactions     Pneumococcal 13-Miranda Conj Vacc Rash      Social History     Tobacco Use     Smoking status: Never     Smokeless tobacco: Never   Substance Use Topics     Alcohol use: Not on file      Wt Readings from Last 1 Encounters:   23 49.9 kg (110 lb)        Anesthesia Evaluation            ROS/MED HX  ENT/Pulmonary:    (-) sleep apnea   Neurologic:  - neg neurologic ROS     Cardiovascular:  - neg cardiovascular ROS     METS/Exercise Tolerance:     Hematologic:       Musculoskeletal:  - neg musculoskeletal ROS     GI/Hepatic:    (-) GERD   Renal/Genitourinary:  - neg Renal ROS     Endo:  - neg endo ROS     Psychiatric/Substance Use:     (+) psychiatric history anxiety and depression     Infectious Disease:  - neg infectious disease ROS     Malignancy:   (+) Malignancy, History of Breast.Breast CA Active status post.        Other:            Physical Exam    Airway        Mallampati: II   TM distance: > 3 FB   Neck ROM: full   Mouth opening: > 3 cm    Respiratory Devices and Support         Dental       (+) Minor Abnormalities - some fillings, tiny chips      Cardiovascular   cardiovascular exam normal          Pulmonary   pulmonary exam normal                OUTSIDE LABS:  CBC:   Lab Results   Component Value Date    WBC 7.4 03/10/2020    HGB 14.7 03/10/2020    HCT 43.3 03/10/2020     03/10/2020     BMP:   Lab Results   Component Value Date     03/10/2020    POTASSIUM 3.5 03/10/2020    CHLORIDE 108 03/10/2020    CO2 26 03/10/2020    BUN 17 03/10/2020    CR 0.72 03/10/2020    GLC 92 03/10/2020     COAGS: No results found for: PTT, INR, FIBR  POC: No results found for: BGM, HCG, HCGS  HEPATIC: No results  found for: ALBUMIN, PROTTOTAL, ALT, AST, GGT, ALKPHOS, BILITOTAL, BILIDIRECT, ROSANNA  OTHER:   Lab Results   Component Value Date    BUSHRA 9.6 03/10/2020       Anesthesia Plan    ASA Status:  3   NPO Status:  NPO Appropriate    Anesthesia Type: MAC.     - Reason for MAC: straight local not clinically adequate              Consents    Anesthesia Plan(s) and associated risks, benefits, and realistic alternatives discussed. Questions answered and patient/representative(s) expressed understanding.    - Discussed:     - Discussed with:  Patient         Postoperative Care    Pain management: IV analgesics.   PONV prophylaxis: Ondansetron (or other 5HT-3), Background Propofol Infusion     Comments:                Eliezer Pratt, DO, DO

## 2023-03-07 NOTE — TELEPHONE ENCOUNTER
Name of caller: Patient    Reason for Call:  Patient is scheduled for surgery tomorrow with -Radio Frequency Identification localized right lumpectomy.  She has questions about what medications she is ok to take tonight. Please call to advise.       Best phone number to reach pt at is: [phone]    Ok to leave a message with medical info? Yes.

## 2023-03-07 NOTE — TELEPHONE ENCOUNTER
Returned pt call  Pt asked if we had received her pre op paperwork. Infomred her we can see her pre-op exam that was faxed over 3/1/23  Pt asked about taking medications the morning of surgery. We discussed how her pcp should advise on which medications she can go without taking.   We discussed showering. Pt asked if she could get away with only showering at night. Advised we recommend showering night before and morning of.     Pt verbalized understanding and agreeable. She did not have any other questions or concerns.    Kitty Paulino RN on 3/7/2023 at 3:29 PM

## 2023-03-08 ENCOUNTER — HOSPITAL ENCOUNTER (OUTPATIENT)
Dept: MAMMOGRAPHY | Facility: CLINIC | Age: 88
Discharge: HOME | End: 2023-03-08
Attending: SURGERY
Payer: MEDICARE

## 2023-03-08 ENCOUNTER — HOSPITAL ENCOUNTER (OUTPATIENT)
Facility: CLINIC | Age: 88
Discharge: HOME | End: 2023-03-08
Attending: SURGERY | Admitting: SURGERY
Payer: MEDICARE

## 2023-03-08 ENCOUNTER — APPOINTMENT (OUTPATIENT)
Dept: SURGERY | Facility: PHYSICIAN GROUP | Age: 88
End: 2023-03-08
Payer: MEDICARE

## 2023-03-08 VITALS
BODY MASS INDEX: 21.48 KG/M2 | DIASTOLIC BLOOD PRESSURE: 72 MMHG | WEIGHT: 109.4 LBS | SYSTOLIC BLOOD PRESSURE: 128 MMHG | RESPIRATION RATE: 16 BRPM | OXYGEN SATURATION: 97 % | HEIGHT: 60 IN | HEART RATE: 76 BPM | TEMPERATURE: 97.9 F

## 2023-03-08 DIAGNOSIS — C80.1 MICROPAPILLARY CARCINOMA (H): ICD-10-CM

## 2023-03-08 DIAGNOSIS — G89.18 POST-OP PAIN: Primary | ICD-10-CM

## 2023-03-08 PROCEDURE — 999N000141 HC STATISTIC PRE-PROCEDURE NURSING ASSESSMENT: Performed by: SURGERY

## 2023-03-08 PROCEDURE — 999N000104 MA BREAST SPECIMEN RIGHT OR

## 2023-03-08 PROCEDURE — 88307 TISSUE EXAM BY PATHOLOGIST: CPT | Mod: TC | Performed by: SURGERY

## 2023-03-08 PROCEDURE — 272N000001 HC OR GENERAL SUPPLY STERILE: Performed by: SURGERY

## 2023-03-08 PROCEDURE — 258N000003 HC RX IP 258 OP 636: Performed by: ANESTHESIOLOGY

## 2023-03-08 PROCEDURE — 88307 TISSUE EXAM BY PATHOLOGIST: CPT | Mod: 26 | Performed by: PATHOLOGY

## 2023-03-08 PROCEDURE — 250N000011 HC RX IP 250 OP 636: Performed by: SURGERY

## 2023-03-08 PROCEDURE — 710N000009 HC RECOVERY PHASE 1, LEVEL 1, PER MIN: Performed by: SURGERY

## 2023-03-08 PROCEDURE — 370N000017 HC ANESTHESIA TECHNICAL FEE, PER MIN: Performed by: SURGERY

## 2023-03-08 PROCEDURE — 710N000012 HC RECOVERY PHASE 2, PER MINUTE: Performed by: SURGERY

## 2023-03-08 PROCEDURE — 360N000082 HC SURGERY LEVEL 2 W/ FLUORO, PER MIN: Performed by: SURGERY

## 2023-03-08 PROCEDURE — 250N000009 HC RX 250: Performed by: SURGERY

## 2023-03-08 PROCEDURE — 250N000009 HC RX 250: Performed by: ANESTHESIOLOGY

## 2023-03-08 PROCEDURE — 19301 PARTIAL MASTECTOMY: CPT | Mod: RT | Performed by: SURGERY

## 2023-03-08 PROCEDURE — 250N000011 HC RX IP 250 OP 636: Performed by: ANESTHESIOLOGY

## 2023-03-08 RX ORDER — LIDOCAINE HYDROCHLORIDE 20 MG/ML
INJECTION, SOLUTION INFILTRATION; PERINEURAL PRN
Status: DISCONTINUED | OUTPATIENT
Start: 2023-03-08 | End: 2023-03-08

## 2023-03-08 RX ORDER — PROPOFOL 10 MG/ML
INJECTION, EMULSION INTRAVENOUS CONTINUOUS PRN
Status: DISCONTINUED | OUTPATIENT
Start: 2023-03-08 | End: 2023-03-08

## 2023-03-08 RX ORDER — SODIUM CHLORIDE, SODIUM LACTATE, POTASSIUM CHLORIDE, CALCIUM CHLORIDE 600; 310; 30; 20 MG/100ML; MG/100ML; MG/100ML; MG/100ML
INJECTION, SOLUTION INTRAVENOUS CONTINUOUS PRN
Status: DISCONTINUED | OUTPATIENT
Start: 2023-03-08 | End: 2023-03-08

## 2023-03-08 RX ORDER — FENTANYL CITRATE 50 UG/ML
INJECTION, SOLUTION INTRAMUSCULAR; INTRAVENOUS PRN
Status: DISCONTINUED | OUTPATIENT
Start: 2023-03-08 | End: 2023-03-08

## 2023-03-08 RX ORDER — OXYCODONE HYDROCHLORIDE 5 MG/1
5 TABLET ORAL
Status: DISCONTINUED | OUTPATIENT
Start: 2023-03-08 | End: 2023-03-08 | Stop reason: HOSPADM

## 2023-03-08 RX ORDER — OXYCODONE HYDROCHLORIDE 5 MG/1
5-10 TABLET ORAL EVERY 4 HOURS PRN
Qty: 8 TABLET | Refills: 0 | Status: SHIPPED | OUTPATIENT
Start: 2023-03-08 | End: 2023-06-02

## 2023-03-08 RX ORDER — LIDOCAINE HYDROCHLORIDE 10 MG/ML
INJECTION, SOLUTION EPIDURAL; INFILTRATION; INTRACAUDAL; PERINEURAL PRN
Status: DISCONTINUED | OUTPATIENT
Start: 2023-03-08 | End: 2023-03-08 | Stop reason: HOSPADM

## 2023-03-08 RX ORDER — BUPIVACAINE HYDROCHLORIDE 5 MG/ML
INJECTION, SOLUTION PERINEURAL PRN
Status: DISCONTINUED | OUTPATIENT
Start: 2023-03-08 | End: 2023-03-08 | Stop reason: HOSPADM

## 2023-03-08 RX ORDER — ONDANSETRON 2 MG/ML
INJECTION INTRAMUSCULAR; INTRAVENOUS PRN
Status: DISCONTINUED | OUTPATIENT
Start: 2023-03-08 | End: 2023-03-08

## 2023-03-08 RX ORDER — AMOXICILLIN 250 MG
1-2 CAPSULE ORAL 2 TIMES DAILY PRN
Qty: 20 TABLET | Refills: 0 | Status: SHIPPED | OUTPATIENT
Start: 2023-03-08 | End: 2023-06-02

## 2023-03-08 RX ORDER — CEFAZOLIN SODIUM/WATER 2 G/20 ML
2 SYRINGE (ML) INTRAVENOUS
Status: DISCONTINUED | OUTPATIENT
Start: 2023-03-08 | End: 2023-03-08 | Stop reason: HOSPADM

## 2023-03-08 RX ORDER — CEFAZOLIN SODIUM/WATER 2 G/20 ML
2 SYRINGE (ML) INTRAVENOUS SEE ADMIN INSTRUCTIONS
Status: DISCONTINUED | OUTPATIENT
Start: 2023-03-08 | End: 2023-03-08 | Stop reason: HOSPADM

## 2023-03-08 RX ADMIN — PHENYLEPHRINE HYDROCHLORIDE 200 MCG: 10 INJECTION INTRAVENOUS at 13:28

## 2023-03-08 RX ADMIN — PHENYLEPHRINE HYDROCHLORIDE 200 MCG: 10 INJECTION INTRAVENOUS at 13:42

## 2023-03-08 RX ADMIN — PHENYLEPHRINE HYDROCHLORIDE 200 MCG: 10 INJECTION INTRAVENOUS at 13:35

## 2023-03-08 RX ADMIN — SODIUM CHLORIDE, POTASSIUM CHLORIDE, SODIUM LACTATE AND CALCIUM CHLORIDE: 600; 310; 30; 20 INJECTION, SOLUTION INTRAVENOUS at 13:44

## 2023-03-08 RX ADMIN — Medication 2 G: at 12:42

## 2023-03-08 RX ADMIN — PHENYLEPHRINE HYDROCHLORIDE 50 MCG: 10 INJECTION INTRAVENOUS at 13:01

## 2023-03-08 RX ADMIN — PHENYLEPHRINE HYDROCHLORIDE 100 MCG: 10 INJECTION INTRAVENOUS at 13:24

## 2023-03-08 RX ADMIN — PROPOFOL 150 MCG/KG/MIN: 10 INJECTION, EMULSION INTRAVENOUS at 12:37

## 2023-03-08 RX ADMIN — FENTANYL CITRATE 25 MCG: 50 INJECTION, SOLUTION INTRAMUSCULAR; INTRAVENOUS at 13:33

## 2023-03-08 RX ADMIN — PHENYLEPHRINE HYDROCHLORIDE 100 MCG: 10 INJECTION INTRAVENOUS at 13:45

## 2023-03-08 RX ADMIN — PHENYLEPHRINE HYDROCHLORIDE 100 MCG: 10 INJECTION INTRAVENOUS at 12:54

## 2023-03-08 RX ADMIN — FENTANYL CITRATE 25 MCG: 50 INJECTION, SOLUTION INTRAMUSCULAR; INTRAVENOUS at 12:47

## 2023-03-08 RX ADMIN — PHENYLEPHRINE HYDROCHLORIDE 100 MCG: 10 INJECTION INTRAVENOUS at 13:08

## 2023-03-08 RX ADMIN — PHENYLEPHRINE HYDROCHLORIDE 100 MCG: 10 INJECTION INTRAVENOUS at 12:51

## 2023-03-08 RX ADMIN — PHENYLEPHRINE HYDROCHLORIDE 100 MCG: 10 INJECTION INTRAVENOUS at 13:00

## 2023-03-08 RX ADMIN — PHENYLEPHRINE HYDROCHLORIDE 100 MCG: 10 INJECTION INTRAVENOUS at 13:14

## 2023-03-08 RX ADMIN — LIDOCAINE HYDROCHLORIDE 40 MG: 20 INJECTION, SOLUTION INFILTRATION; PERINEURAL at 12:37

## 2023-03-08 RX ADMIN — PHENYLEPHRINE HYDROCHLORIDE 100 MCG: 10 INJECTION INTRAVENOUS at 12:46

## 2023-03-08 RX ADMIN — PHENYLEPHRINE HYDROCHLORIDE 100 MCG: 10 INJECTION INTRAVENOUS at 13:20

## 2023-03-08 RX ADMIN — PHENYLEPHRINE HYDROCHLORIDE 100 MCG: 10 INJECTION INTRAVENOUS at 13:03

## 2023-03-08 RX ADMIN — SODIUM CHLORIDE, POTASSIUM CHLORIDE, SODIUM LACTATE AND CALCIUM CHLORIDE: 600; 310; 30; 20 INJECTION, SOLUTION INTRAVENOUS at 12:35

## 2023-03-08 RX ADMIN — ONDANSETRON 4 MG: 2 INJECTION INTRAMUSCULAR; INTRAVENOUS at 12:42

## 2023-03-08 ASSESSMENT — ACTIVITIES OF DAILY LIVING (ADL)
ADLS_ACUITY_SCORE: 39

## 2023-03-08 NOTE — OP NOTE
General Surgery Operative Note    PREOPERATIVE DIAGNOSIS:  Right breast cancer    POSTOPERATIVE DIAGNOSIS:  Same    PROCEDURE:   Procedure(s):  Procedure(s):  Radio Frequency Identification localized right lumpectomy  ONCOPLASTIC CLOSURE    ANESTHESIA:  General.    PREOPERATIVE MEDICATIONS:  Ancef IV    SURGEON:  Basil Calvillo MD    ASSISTANT:  Arden Llanos PA-C    INDICATIONS:  Recently diagnosed right breast cancer    DESCRIPTION OF PROCEDURE: The patient was taken to the operating suite and given IV sedation.  The right breast was prepped and draped in a sterile fashion. We made a radial incision and directed our dissection toward the seed using the probe. We used electrocautery to create a specimen and a margin around the seed and lesion. We took this down to the level of the fascia.  We excised out the lump and this was painted using intraoperative ink in the predetermined fashion. Intra-op mammogram was obtained, which demonstrated the seed, clip, and lesion.  The specimen was sent to pathology. We then mobilized the tissue around the lumpectomy, both superficially and at the level of the fascia.  This tissue was then reapproximated with 3-0 Vicryl suture to close the cavity and fill the defect.  We then irrigated out the cavity and closed the skin in layers using 3-0 and 4-0 Vicryl. At the conclusion of the case, all lap and needle counts were correct. Estimated blood loss was 5 cc.      ESTIMATED BLOOD LOSS:  5 mL    INTRAOPERATIVE FINDINGS:  Specimen mammogram demonstrated lesion, seed, and clip.  Gross pathologic evaluation suggested negative margins.    Basli Calvillo MD

## 2023-03-08 NOTE — DISCHARGE INSTRUCTIONS
Same Day Surgery Discharge Instructions for  Sedation and General Anesthesia     It's not unusual to feel dizzy, light-headed or faint for up to 24 hours after surgery or while taking pain medication.  If you have these symptoms: sit for a few minutes before standing and have someone assist you when you get up to walk or use the bathroom.    You should rest and relax for the next 24 hours. We recommend you make arrangements to have an adult stay with you for at least 24 hours after your discharge.  Avoid hazardous and strenuous activity.    DO NOT DRIVE any vehicle or operate mechanical equipment for 24 hours following the end of your surgery.  Even though you may feel normal, your reactions may be affected by the medication you have received.    Do not drink alcoholic beverages for 24 hours following surgery.     Slowly progress to your regular diet as you feel able. It's not unusual to feel nauseated and/or vomit after receiving anesthesia.  If you develop these symptoms, drink clear liquids (apple juice, ginger ale, broth, 7-up, etc. ) until you feel better.  If your nausea and vomiting persists for 24 hours, please notify your surgeon.      All narcotic pain medications, along with inactivity and anesthesia, can cause constipation. Drinking plenty of liquids and increasing fiber intake will help.    For any questions of a medical nature, call your surgeon.    Do not make important decisions for 24 hours.    If you had general anesthesia, you may have a sore throat for a couple of days related to the breathing tube used during surgery.  You may use Cepacol lozenges to help with this discomfort.  If it worsens or if you develop a fever, contact your surgeon.     If you feel your pain is not well managed with the pain medications prescribed by your surgeon, please contact your surgeon's office to let them know so they can address your concerns.                [facility]  Discharge  Instructions: Post-Operative Breast Surgery    ACTIVITY  Take frequent short walks and increase your activity gradually.    Avoid strenuous physical activity or heavy lifting greater than 15-20 lbs. for 1-2 weeks with arm on the surgery side.  You may climb stairs.  Gentle rotation and stretching of your arms and shoulders will prevent joint stiffness.  You may drive without restrictions when you are not using any prescription pain medication and feel comfortable in a car.  You may return to work/school when you are comfortable without any prescription pain medication.    WOUND CARE  You may remove your ACE wrap/dressing and shower 48 hours after the surgery.  Pat your incisions dry and leave them open to air.  Re-apply dressing (Band-Aids or gauze/tape) as needed for drainage.  You may have steri-strips (looks like white tape) or skin glue on your incisions.  You may peel off the steri-strips 2 weeks after your surgery if they have not peeled off on their own.  If you have skin glue, it will peel up and fall off on its own.  Do not soak your incisions in a tub or pool for 2 weeks.   Do not apply any lotions, creams, or ointments to your incisions.  A ridge under your incisions is normal and will gradually resolve.  Wear a supportive bra for 1-2 weeks, day and night.    DIET  Start with liquids, then gradually resume your regular diet as tolerated.   Drink plenty of liquids to stay hydrated.    PAIN  Expect some tenderness and discomfort at the incision site(s).  Use the prescribed pain medication at your discretion.  Expect gradual resolution of your pain over several days.  You may take ibuprofen with food (unless you have been told not to) or acetaminophen/Tylenol instead of or in addition to your prescribed pain medication.  If you are taking Norco or Percocet, do not take any additional acetaminophen/Tylenol.  Do not drink alcohol or drive while you are taking pain medications.    EXPECTATIONS  Pain medications  can cause constipation.  Limit use when possible.  Take over the counter stool softener/stimulant, such as Colace or Senna, 1-2 times a day with plenty of water.  You may take a mild over the counter laxative, such as Miralax or a suppository, as needed.    You may discontinue these medications once you are having regular bowel movements and/or are no longer taking your narcotic pain medication.    RETURN APPOINTMENT  Follow up with your surgeon in 2-4 weeks.  Please call the office at [phone] to schedule your appointment.      CALL OUR OFFICE -[phone] IF YOU HAVE:   Chills or fever above 101 F.  Increased redness, warmth, or drainage at your incisions.  Significant bleeding.  Pain not relieved by your pain medication or rest.  Increasing pain after the first 48 hours.  Any other concerns or questions.            ** If you have concerns or questions about your procedure,    please contact Dr Calvillo at  [phone] **

## 2023-03-08 NOTE — ANESTHESIA POSTPROCEDURE EVALUATION
Patient: Krupa Mora    Procedure: Procedure(s):  Radio Frequency Identification localized right lumpectomy       Anesthesia Type:  MAC    Note:  Disposition: Outpatient   Postop Pain Control: Uneventful            Sign Out: Well controlled pain   PONV: No   Neuro/Psych: Uneventful            Sign Out: Acceptable/Baseline neuro status   Airway/Respiratory: Uneventful            Sign Out: Acceptable/Baseline resp. status   CV/Hemodynamics: Uneventful            Sign Out: Acceptable CV status; No obvious hypovolemia; No obvious fluid overload   Other NRE: NONE   DID A NON-ROUTINE EVENT OCCUR?            Last vitals:  Vitals Value Taken Time   /69 03/08/23 1430   Temp 37  C (98.6  F) 03/08/23 1430   Pulse 71 03/08/23 1436   Resp 9 03/08/23 1436   SpO2 97 % 03/08/23 1436   Vitals shown include unvalidated device data.    Electronically Signed By: Eliezer Pratt DO, DO  March 8, 2023  2:54 PM

## 2023-03-08 NOTE — BRIEF OP NOTE
[facility]    Brief Operative Note    Pre-operative diagnosis: Micropapillary carcinoma (H) [C80.1]  Post-operative diagnosis Same as pre-operative diagnosis    Procedure: Procedure(s):  Radio Frequency Identification localized right lumpectomy  Surgeon: Surgeon(s) and Role:     * Basil Calvillo MD - Primary     * Arden Llanos PA-C - Assisting     * Andriy Yanez MD  Anesthesia: MAC with Local   Estimated Blood Loss: 5cc    Drains: None  Specimens:   ID Type Source Tests Collected by Time Destination   1 : right breast lumpectomy  Tissue Breast, Right SURGICAL PATHOLOGY EXAM Basil Calvillo MD 3/8/2023  1:12 PM      Findings:   See formal op note.  Complications: None.  Implants: * No implants in log *      Arden Llanos PA-C

## 2023-03-08 NOTE — ANESTHESIA CARE TRANSFER NOTE
Patient: Krupa Mora    Procedure: Procedure(s):  Radio Frequency Identification localized right lumpectomy       Diagnosis: Micropapillary carcinoma (H) [C80.1]  Diagnosis Additional Information: No value filed.    Anesthesia Type:   MAC     Note:    Oropharynx: oropharynx clear of all foreign objects and spontaneously breathing  Level of Consciousness: awake  Oxygen Supplementation: face mask  Level of Supplemental Oxygen (L/min / FiO2): 6    Dentition: dentition unchanged  Vital Signs Stable: post-procedure vital signs reviewed and stable  Report to RN Given: handoff report given  Patient transferred to: PACU    Handoff Report: Identifed the Patient, Identified the Reponsible Provider, Reviewed the pertinent medical history, Discussed the surgical course, Reviewed Intra-OP anesthesia mangement and issues during anesthesia, Set expectations for post-procedure period and Allowed opportunity for questions and acknowledgement of understanding      Vitals:  Vitals Value Taken Time   /73    Temp     Pulse 68    Resp 14    SpO2 99 % 03/08/23 1350   Vitals shown include unvalidated device data.    Electronically Signed By: MARY Geronimo CRNA  March 8, 2023  1:51 PM

## 2023-03-08 NOTE — OR NURSING
Patient alert and oriented x 4.  VSS. Patient voided prior to discharge.  Discharge instructions read through with Patient and Family member (Todd-son).  Medications given to family member.  IV removed.  Patient escorted to door in a wheelchair with staff.

## 2023-03-10 ENCOUNTER — TELEPHONE (OUTPATIENT)
Dept: SURGERY | Facility: CLINIC | Age: 88
End: 2023-03-10
Payer: MEDICARE

## 2023-03-10 NOTE — TELEPHONE ENCOUNTER
"Procedure: radio frequency identified localized right lumpectomy, oncoplastic closure    Date: 3/8/23    Surgeon: Rajinder    Patient reports that, initially after surgery things seemed to be going well.     By the evening, she began experiencing some breast swelling so she started applying ice, which did help    She reports that parts of her right breast and the section between both breasts is \"black and blue\" and tender. Somewhat better this morning and seems to be looking even better as today has gone on    No draining. No fever. No redness. Not warm to the touch.    No increasing pain.     She has not had to use any pain medications. She used ibuprofen and ativan the first night    She has been trying to wear supportive bra, but the elastic has caused her some discomfort. She is having her son return the size small for a size medium to see if this makes it easier to wear    Patient wanting to make sure that present symptoms are not concerning?    Informed her that some bruising, tenderness after surgery is expected.  It is a good sign that it already seems to be improving and that she is not needing to use pain medications.  Advised her to try to wear the supportive sports bra as this helps provide support and comfort to the area, can help keep bruising and swelling down. She agreed    Continue ice PRN    Okay to shower. Do not need to replace dressing, unless draining.      Discussed signs and symptoms of infection and when to call.  If bruising gets worse, she should call.  If she begins experiencing any persisting, worsening, or new symptoms or concerns, she should call.    All questions answered and she will call PRN    She knows that there is an on call provider 24 hours a day should she have any significant concerns. Dr. Calvillo will call her next week (likely) once pathology results are back    Helen Painter, RN-BSN          "

## 2023-03-10 NOTE — TELEPHONE ENCOUNTER
Name of caller: Patient    Reason for Call:  Patient is having some swelling and what seems to her like an excessive amount of bruising on her breast.It is very tender, but no oozing. Wondering if this is normal?    Surgeon:  Dr. Calvillo    Recent Surgery:  Yes.    If yes, when & what type:  3/8/2023      Radio Frequency Identification localized right lumpectomy    Best phone number to reach pt at is: [phone]    Ok to leave a message with medical info? Yes.

## 2023-03-13 LAB
PATH REPORT.COMMENTS IMP SPEC: ABNORMAL
PATH REPORT.COMMENTS IMP SPEC: ABNORMAL
PATH REPORT.COMMENTS IMP SPEC: YES
PATH REPORT.FINAL DX SPEC: ABNORMAL
PATH REPORT.GROSS SPEC: ABNORMAL
PATH REPORT.MICROSCOPIC SPEC OTHER STN: ABNORMAL
PATH REPORT.RELEVANT HX SPEC: ABNORMAL
PATHOLOGY SYNOPTIC REPORT: ABNORMAL
PHOTO IMAGE: ABNORMAL

## 2023-03-17 NOTE — TELEPHONE ENCOUNTER
Patient calling back to speak with a nurse.   She continues to have the same symptoms with swelling of the breast and considerable bruising. No drainage at all.  She is concerned there is something wrong and will feel better discussing with the nurse.     [phone]  OK to leave VM   Morphology Per Location (Optional): Slightly atypical Detail Level: Simple Size Of Lesion: 3mm Body Location Override (Optional - Billing Will Still Be Based On Selected Body Map Location If Applicable): left upper back Body Location Override (Optional - Billing Will Still Be Based On Selected Body Map Location If Applicable): left mid back Body Location Override (Optional - Billing Will Still Be Based On Selected Body Map Location If Applicable): mid buttocks Body Location Override (Optional - Billing Will Still Be Based On Selected Body Map Location If Applicable): left groin area Size Of Lesion: 4mm Body Location Override (Optional - Billing Will Still Be Based On Selected Body Map Location If Applicable): right upper arm Size Of Lesion: 1mm Body Location Override (Optional - Billing Will Still Be Based On Selected Body Map Location If Applicable): right upper eyelid Body Location Override (Optional - Billing Will Still Be Based On Selected Body Map Location If Applicable): right mid back Morphology Per Location (Optional): Two toned nevus Size Of Lesion: 2mm Morphology Per Location (Optional): Likely SK

## 2023-03-17 NOTE — TELEPHONE ENCOUNTER
"I spoke with patient who states she still has bruising on the right side of her breast from her Right Breast Lumpectomy done 3/8/23 with Dr. Calvillo, but it \"has not got worse\".       Patient states the color of the bruising is slowly changing colors as it lightens up and she \"cannot tell if the swelling is improving, but it is not getting worse or spreading\".  Patient denies discomfort and actually reports only taking a couple tylenol the day after her surgery.    Patient denies fevers, or drainage.  States she is taking a shower every other day and the steri strips are in place, but a \"few are getting loose\".  Informed patient to keep the steri strips on until she comes in next week to see Dr. Calvillo for a post op check.    Informed patient to continue to monitor her symptoms and the breast, and if any concerns or symptoms worsen to call us back otherwise we will see her in clinic on 3/23/23 for a post op check.     Patient verbalizes understanding and agrees with the plan of care.  Shivani Juarez RN BSN  Breast Nurse Care Coordinator  [facility] [place]  [phone]    "

## 2023-03-23 ENCOUNTER — OFFICE VISIT (OUTPATIENT)
Dept: SURGERY | Facility: CLINIC | Age: 88
End: 2023-03-23
Payer: MEDICARE

## 2023-03-23 DIAGNOSIS — Z09 SURGERY FOLLOW-UP EXAMINATION: Primary | ICD-10-CM

## 2023-03-23 PROCEDURE — 99024 POSTOP FOLLOW-UP VISIT: CPT | Performed by: SURGERY

## 2023-03-24 NOTE — PROGRESS NOTES
Surgery Postop Note    Krupa Mora presents today for surgical followup.  she is doing well following right lumpectomy.  Incisions look fine with no signs of wound infection.  There is a significant hematoma, but no evidence of cellulitis.  We will have her meet with rad/onc for consult.  I expect her to make a complete recovery.  Thank you for the opportunity to help in her care.    Jamir Calvillo M.D.  [facility]  [phone]    Please route or send letter to:  Primary Care Provider (PCP) and Referring Provider         Well developed/No deformities present

## 2023-04-18 ENCOUNTER — TRANSFERRED RECORDS (OUTPATIENT)
Dept: HEALTH INFORMATION MANAGEMENT | Facility: CLINIC | Age: 88
End: 2023-04-18
Payer: MEDICARE

## 2023-05-23 ENCOUNTER — TRANSFERRED RECORDS (OUTPATIENT)
Dept: HEALTH INFORMATION MANAGEMENT | Facility: CLINIC | Age: 88
End: 2023-05-23
Payer: MEDICARE

## 2023-06-02 ENCOUNTER — ONCOLOGY VISIT (OUTPATIENT)
Dept: ONCOLOGY | Facility: CLINIC | Age: 88
End: 2023-06-02
Attending: INTERNAL MEDICINE
Payer: MEDICARE

## 2023-06-02 VITALS
DIASTOLIC BLOOD PRESSURE: 84 MMHG | BODY MASS INDEX: 21.05 KG/M2 | OXYGEN SATURATION: 97 % | WEIGHT: 107.8 LBS | HEART RATE: 97 BPM | RESPIRATION RATE: 16 BRPM | SYSTOLIC BLOOD PRESSURE: 158 MMHG

## 2023-06-02 DIAGNOSIS — C80.1 MICROPAPILLARY CARCINOMA (H): ICD-10-CM

## 2023-06-02 DIAGNOSIS — M85.80 OSTEOPENIA, UNSPECIFIED LOCATION: ICD-10-CM

## 2023-06-02 DIAGNOSIS — R21 RASH: Primary | ICD-10-CM

## 2023-06-02 DIAGNOSIS — M85.89 OTHER SPECIFIED DISORDERS OF BONE DENSITY AND STRUCTURE, MULTIPLE SITES: ICD-10-CM

## 2023-06-02 PROCEDURE — 99214 OFFICE O/P EST MOD 30 MIN: CPT | Performed by: INTERNAL MEDICINE

## 2023-06-02 PROCEDURE — G0463 HOSPITAL OUTPT CLINIC VISIT: HCPCS | Performed by: INTERNAL MEDICINE

## 2023-06-02 RX ORDER — TRIAMCINOLONE ACETONIDE 1 MG/G
CREAM TOPICAL 2 TIMES DAILY
Qty: 30 G | Refills: 1 | Status: SHIPPED | OUTPATIENT
Start: 2023-06-02

## 2023-06-02 ASSESSMENT — PAIN SCALES - GENERAL: PAINLEVEL: NO PAIN (0)

## 2023-06-02 NOTE — LETTER
6/2/2023         RE: Krupa Mora  [address]  [facility] [address]        Dear Colleague,    Thank you for referring your patient, Krupa Mora, to the [facility]. Please see a copy of my visit note below.       [facility]    Hematology/Oncology New Patient Note      Today's Date:  June 2, 2023  Reason for Consult: Right breast cancer       HISTORY OF PRESENT ILLNESS: Krupa Mora is a 87 year old female who presents with the following oncology history    1.  Palpable lump in the right breast by PCP  2.Diagnostic mammogram and ultrasound showed a suspicious lesion in the right breast approximately 2 cm  3.  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  No significant family history of breast cancer  5.  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  She clinically feels well.  She had a lumpectomy completed and radiation therapy That went well.  She did 5 days of radiation.  She is not interested in discussion of endocrine therapy.  We did discuss aromatase inhibitors and tamoxifen and patient states that the side effects outweigh the benefit in her mind and at her age she is comfortable with observation alone. She has a rash on her chest         REVIEW OF SYSTEMS:   14 point ROS was reviewed and is negative other than as noted above in HPI.       HOME MEDICATIONS:  Current Outpatient Medications   Medication Sig Dispense Refill     atorvastatin (LIPITOR) 10 MG tablet Take 10 mg by mouth daily       calcium citrate-vitamin D (CITRACAL) 315-250 MG-UNIT TABS per tablet Take by mouth 2 times  daily       ipratropium (ATROVENT) 0.06 % nasal spray Spray 2 sprays into both nostrils 4 times daily       LORazepam (ATIVAN) 0.5 MG tablet Take 0.5 mg by mouth every 6 hours as needed for anxiety       MAGNESIUM GLYCINATE PO Take 2 tablets of 200mg each day       Simethicone (GAS-X PO)        Sennosides (SENOKOT PO)  (Patient not taking: Reported on 6/2/2023)           ALLERGIES:  Allergies   Allergen Reactions     Pneumococcal 13-Miranda Conj Vacc Rash         PAST MEDICAL HISTORY:  Hyperlipidemia    PAST SURGICAL HISTORY:  Past Surgical History:   Procedure Laterality Date     LUMPECTOMY BREAST Right 3/8/2023    Procedure: Radio Frequency Identification localized right lumpectomy;  Surgeon: Basil Calvillo MD;  Location:  OR         SOCIAL HISTORY:  Social History     Socioeconomic History     Marital status:      Spouse name: Not on file     Number of children: Not on file     Years of education: Not on file     Highest education level: Not on file   Occupational History     Not on file   Tobacco Use     Smoking status: Never     Smokeless tobacco: Never   Vaping Use     Vaping status: Never Used   Substance and Sexual Activity     Alcohol use: Not Currently     Drug use: Never     Sexual activity: Not on file   Other Topics Concern     Parent/sibling w/ CABG, MI or angioplasty before 65F 55M? Not Asked   Social History Narrative     Not on file     Social Determinants of Health     Financial Resource Strain: Not on file   Food Insecurity: Not on file   Transportation Needs: Not on file   Physical Activity: Not on file   Stress: Not on file   Social Connections: Not on file   Intimate Partner Violence: Not on file   Housing Stability: Not on file         FAMILY HISTORY:  No Family history of breast cancer, ovarian cancer pancreatic cancer    PHYSICAL EXAM:  Vital signs:  BP (!) 158/84   Pulse 97   Resp 16   Wt 48.9 kg (107 lb 12.8 oz)   SpO2 97%   BMI 21.05 kg/m     ECOG:  1  GENERAL/CONSTITUTIONAL: No acute distress.  EYES: Pupils are equal, round, and react to light and accommodation. Extraocular movements intact.  No scleral icterus.  ENT/MOUTH: Neck supple. Oropharynx clear, no mucositis.  LYMPH: No anterior cervical, posterior cervical, supraclavicular, axillary or inguinal adenopathy.   RESPIRATORY: Clear to auscultation bilaterally. No crackles or wheezing.   CARDIOVASCULAR: Regular rate and rhythm without murmurs, gallops, or rubs.  GASTROINTESTINAL: No hepatosplenomegaly, masses, or tenderness. The patient has normal bowel sounds. No guarding.  No distention.  MUSCULOSKELETAL: Warm and well-perfused, no cyanosis, clubbing, or edema.  NEUROLOGIC: Cranial nerves II-XII are intact. Alert, oriented, answers questions appropriately.  INTEGUMENTARY: No rashes or jaundice.  GAIT: Steady, does not use assistive device  Bilateral breast exam is normal no evidence of infection.  Right breast status postlumpectomy area is healing very well.  Radiation dermatitis       LABS:  CBC RESULTS:       PATHOLOGY:  Right breast, lumpectomy:  --Invasive ductal carcinoma with micropapillary features, Cusick grade 2 (of 3), 2.0 cm.  --Admixed ductal carcinoma in situ (DCIS), nuclear grade 2 (of 3).  --Multiple foci of lymphovascular invasion are identified.  --Invasive carcinoma is located 0.2 cm from the closest posterior and anterior margins.  --DCIS is located 0.3 cm from the closest posterior margin.    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  Her risk of recurrence is higher because of the micropapillary nature of the tumor and multiple foci of lymphovascular invasion but again she is comfortable monitoring it at this point.  She does need an updated bone density scan which we can order before her next visit      1.  Breast cancer: Observation alone.  JOSE EDUARDO visit in 6 months with labs.  DEXA.  Survivorship follow-ups with JOSE EDUARDO    2 rash chest: trimacinolone cream prn     Total time spent on day of visit, including review of tests, obtaining/reviewing separately obtained history, ordering medications/tests/procedures, communicating with PCP/consultants, and documenting in electronic medical record: 30 minutes                Again, thank you for allowing me to participate in the care of your patient.        Sincerely,        Winston Kraft MD

## 2023-06-02 NOTE — PROGRESS NOTES
[facility]    Hematology/Oncology New Patient Note      Today's Date:  June 2, 2023  Reason for Consult: Right breast cancer       HISTORY OF PRESENT ILLNESS: Krupa Mora is a 87 year old female who presents with the following oncology history    1.  Palpable lump in the right breast by PCP  2.Diagnostic mammogram and ultrasound showed a suspicious lesion in the right breast approximately 2 cm  3.  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  No significant family history of breast cancer  5.  Lumpectomy on 3/8/2023 final pathology showed invasive ductal carcinoma with micropapillary features Reynolds grade 2 measuring 2 cm With associated DCIS grade  multiple foci of lymphovascular invasion margins negative  6  DEXA scan completed in 2019 showed a T score of -1.3    Krupa returns for follow-up.  She clinically feels well.  She had a lumpectomy completed and radiation therapy That went well.  She did 5 days of radiation.  She is not interested in discussion of endocrine therapy.  We did discuss aromatase inhibitors and tamoxifen and patient states that the side effects outweigh the benefit in her mind and at her age she is comfortable with observation alone. She has a rash on her chest         REVIEW OF SYSTEMS:   14 point ROS was reviewed and is negative other than as noted above in HPI.       HOME MEDICATIONS:  Current Outpatient Medications   Medication Sig Dispense Refill     atorvastatin (LIPITOR) 10 MG tablet Take 10 mg by mouth daily       calcium citrate-vitamin D (CITRACAL) 315-250 MG-UNIT TABS per tablet Take by mouth 2 times daily       ipratropium (ATROVENT) 0.06 % nasal spray Spray 2 sprays into both nostrils 4 times daily       LORazepam (ATIVAN) 0.5 MG tablet Take 0.5 mg by mouth every 6 hours as needed for anxiety       MAGNESIUM GLYCINATE PO Take 2 tablets of 200mg each day        Simethicone (GAS-X PO)        Sennosides (SENOKOT PO)  (Patient not taking: Reported on 2023)           ALLERGIES:  Allergies   Allergen Reactions     Pneumococcal 13-Miranda Conj Vacc Rash         PAST MEDICAL HISTORY:  Hyperlipidemia    PAST SURGICAL HISTORY:  Past Surgical History:   Procedure Laterality Date     LUMPECTOMY BREAST Right 3/8/2023    Procedure: Radio Frequency Identification localized right lumpectomy;  Surgeon: Basil Calvillo MD;  Location:  OR         SOCIAL HISTORY:  Social History     Socioeconomic History     Marital status:      Spouse name: Not on file     Number of children: Not on file     Years of education: Not on file     Highest education level: Not on file   Occupational History     Not on file   Tobacco Use     Smoking status: Never     Smokeless tobacco: Never   Vaping Use     Vaping status: Never Used   Substance and Sexual Activity     Alcohol use: Not Currently     Drug use: Never     Sexual activity: Not on file   Other Topics Concern     Parent/sibling w/ CABG, MI or angioplasty before 65F 55M? Not Asked   Social History Narrative     Not on file     Social Determinants of Health     Financial Resource Strain: Not on file   Food Insecurity: Not on file   Transportation Needs: Not on file   Physical Activity: Not on file   Stress: Not on file   Social Connections: Not on file   Intimate Partner Violence: Not on file   Housing Stability: Not on file         FAMILY HISTORY:  No Family history of breast cancer, ovarian cancer pancreatic cancer    PHYSICAL EXAM:  Vital signs:  BP (!) 158/84   Pulse 97   Resp 16   Wt 48.9 kg (107 lb 12.8 oz)   SpO2 97%   BMI 21.05 kg/m     ECO  GENERAL/CONSTITUTIONAL: No acute distress.  EYES: Pupils are equal, round, and react to light and accommodation. Extraocular movements intact.  No scleral icterus.  ENT/MOUTH: Neck supple. Oropharynx clear, no mucositis.  LYMPH: No anterior cervical, posterior cervical,  supraclavicular, axillary or inguinal adenopathy.   RESPIRATORY: Clear to auscultation bilaterally. No crackles or wheezing.   CARDIOVASCULAR: Regular rate and rhythm without murmurs, gallops, or rubs.  GASTROINTESTINAL: No hepatosplenomegaly, masses, or tenderness. The patient has normal bowel sounds. No guarding.  No distention.  MUSCULOSKELETAL: Warm and well-perfused, no cyanosis, clubbing, or edema.  NEUROLOGIC: Cranial nerves II-XII are intact. Alert, oriented, answers questions appropriately.  INTEGUMENTARY: No rashes or jaundice.  GAIT: Steady, does not use assistive device  Bilateral breast exam is normal no evidence of infection.  Right breast status postlumpectomy area is healing very well.  Radiation dermatitis       LABS:  CBC RESULTS:       PATHOLOGY:  Right breast, lumpectomy:  --Invasive ductal carcinoma with micropapillary features, Papillion grade 2 (of 3), 2.0 cm.  --Admixed ductal carcinoma in situ (DCIS), nuclear grade 2 (of 3).  --Multiple foci of lymphovascular invasion are identified.  --Invasive carcinoma is located 0.2 cm from the closest posterior and anterior margins.  --DCIS is located 0.3 cm from the closest posterior margin.    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  Her risk of recurrence is higher because of the micropapillary nature of the tumor and multiple foci of lymphovascular invasion but again she is comfortable monitoring it at this point.  She does need an updated bone density scan which  we can order before her next visit      1.  Breast cancer: Observation alone.  JOSE EDUARDO visit in 6 months with labs.  DEXA.  Survivorship follow-ups with JOSE EDUARDO    2 rash chest: trimacinolone cream prn     Total time spent on day of visit, including review of tests, obtaining/reviewing separately obtained history, ordering medications/tests/procedures, communicating with PCP/consultants, and documenting in electronic medical record: 30 minutes

## 2023-06-08 ENCOUNTER — TELEPHONE (OUTPATIENT)
Dept: ONCOLOGY | Facility: CLINIC | Age: 88
End: 2023-06-08
Payer: MEDICARE

## 2023-06-08 DIAGNOSIS — R21 RASH: Primary | ICD-10-CM

## 2023-06-08 DIAGNOSIS — R21 RASH: ICD-10-CM

## 2023-06-08 DIAGNOSIS — T66.XXXD ADVERSE EFFECT OF RADIATION, SUBSEQUENT ENCOUNTER: ICD-10-CM

## 2023-06-08 NOTE — TELEPHONE ENCOUNTER
Patient is calling with update. She was given triamcinolone cream at her 6/2/23 visit with Dr. Kraft for rash on her chest. Patient thinks rash is related to radiation tx. There is slight itch, looks like red patches. No new or worsening sx since seen. No fever, chills, or swelling noted. She has been using triamcinolone cream once daily for the last 5 days and has not seen any improvement. She is wondering if there is something else she can try to help with the rash. Please see office visit noted dated 6/2/23.    Edna Valencia, RN, BSN, PHN, OCN  [facility]

## 2023-06-09 NOTE — TELEPHONE ENCOUNTER
"Called patient and discussed the message noted below from Brenda Bauer PA-C. She states she was given a little bag of sample creams to try but has not used this. She reports she used aveeno yesterday and feels much better today. No new or worsening sx. She would like to continue to use Aveeno and see how she does.     Writer will follow up with the patient on Monday. She will call back with any new or worsening sx.     Edna Valencia RN, BSN, PHN, OCN  [facility]          \"Usually the radiation team provides them with a barrier cream and other measures to try. Can you please ask the patient what they gave her? If nothing, I sent RX for Cavilon which is a prescription strength barrier cream. She should increase to twice per day steoird.  I have seen lidocaine spray used for pain relief as well and sent to [facility].     Plan:   Increase steroid to BID   Trial Cavilon or other barrier BID following steroid   Lidocaine spray PRN.\"    "

## 2023-06-12 NOTE — TELEPHONE ENCOUNTER
Called patient to follow up, left message to call back.     Edna Valencia RN, BSN, PHN, OCN  [facility]

## 2023-06-13 NOTE — TELEPHONE ENCOUNTER
Called patient for follow up, states rash is improving everyday. She continues to use Aveeno only as this works best for her. She is keeping the area clean and dry and wearing loose clothing. She would like a quick phone call from Dr. Kraft upon her return regarding rash. States this would give her peace of mind. Advised patient to continue to monitor sx and call us back with any new or worsening sx. Patient agrees.     Edan Valencia, RN, BSN, PHN, OCN  [facility]

## 2023-06-14 NOTE — TELEPHONE ENCOUNTER
Called patient, is not able to load an image at this time. She does not have the technology needed to upload this or someone to help her take the picture. She feels the rash is improving everyday. She will continue to monitor and call back on Friday or Monday with an update.    Edna Valencia RN, BSN, PHN, OCN  M[facility]

## 2023-07-27 ENCOUNTER — TRANSFERRED RECORDS (OUTPATIENT)
Dept: HEALTH INFORMATION MANAGEMENT | Facility: CLINIC | Age: 88
End: 2023-07-27
Payer: MEDICARE

## 2023-08-07 ENCOUNTER — ONCOLOGY VISIT (OUTPATIENT)
Dept: ONCOLOGY | Facility: CLINIC | Age: 88
End: 2023-08-07
Attending: INTERNAL MEDICINE
Payer: MEDICARE

## 2023-08-07 VITALS
BODY MASS INDEX: 20.81 KG/M2 | HEIGHT: 60 IN | SYSTOLIC BLOOD PRESSURE: 149 MMHG | RESPIRATION RATE: 16 BRPM | OXYGEN SATURATION: 97 % | HEART RATE: 98 BPM | DIASTOLIC BLOOD PRESSURE: 88 MMHG | WEIGHT: 106 LBS

## 2023-08-07 DIAGNOSIS — Z17.0 MALIGNANT NEOPLASM OF AXILLARY TAIL OF LEFT BREAST IN FEMALE, ESTROGEN RECEPTOR POSITIVE (H): ICD-10-CM

## 2023-08-07 DIAGNOSIS — M81.0 AGE-RELATED OSTEOPOROSIS WITHOUT CURRENT PATHOLOGICAL FRACTURE: ICD-10-CM

## 2023-08-07 DIAGNOSIS — C50.612 MALIGNANT NEOPLASM OF AXILLARY TAIL OF LEFT BREAST IN FEMALE, ESTROGEN RECEPTOR POSITIVE (H): ICD-10-CM

## 2023-08-07 DIAGNOSIS — C80.1 MICROPAPILLARY CARCINOMA (H): ICD-10-CM

## 2023-08-07 DIAGNOSIS — M85.80 OSTEOPENIA, UNSPECIFIED LOCATION: Primary | ICD-10-CM

## 2023-08-07 PROCEDURE — G0463 HOSPITAL OUTPT CLINIC VISIT: HCPCS | Performed by: INTERNAL MEDICINE

## 2023-08-07 PROCEDURE — 99214 OFFICE O/P EST MOD 30 MIN: CPT | Performed by: INTERNAL MEDICINE

## 2023-08-07 ASSESSMENT — PAIN SCALES - GENERAL: PAINLEVEL: NO PAIN (0)

## 2023-08-07 NOTE — PROGRESS NOTES
[facility]    Hematology/Oncology New Patient Note      Today's Date:  August 7, 2023    Reason for Consult: Right breast cancer       HISTORY OF PRESENT ILLNESS: Krupa Mora is a 87 year old female who presents with the following oncology history    1.  Palpable lump in the right breast by PCP  2.Diagnostic mammogram and ultrasound showed a suspicious lesion in the right breast approximately 2 cm  3.  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  No significant family history of breast cancer  5.  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  She had a chest wall rash that has resolved now but she wanted me to look at that.  She also feels like her right collarbone is more protuberant than the left when she wanted to make sure that that was normal        REVIEW OF SYSTEMS:   14 point ROS was reviewed and is negative other than as noted above in HPI.       HOME MEDICATIONS:  Current Outpatient Medications   Medication Sig Dispense Refill    atorvastatin (LIPITOR) 10 MG tablet Take 10 mg by mouth daily      calcium citrate-vitamin D (CITRACAL) 315-250 MG-UNIT TABS per tablet Take by mouth 2 times daily      Dimethicone 1.3 % CREA EXTERNALLY APPLY TOPICALLY 2 TIMES DAILY 92 g 1    ipratropium (ATROVENT) 0.06 % nasal spray Spray 2 sprays into both nostrils 4 times daily      Lidocaine-Menthol, Spray, 4-1 % LIQD Externally apply 1 spray topically 4 times daily as needed (radiation dermatitis) 118 mL 1    LORazepam (ATIVAN) 0.5 MG tablet Take 0.5 mg by mouth every 6 hours as needed  for anxiety      MAGNESIUM GLYCINATE PO Take 2 tablets of 200mg each day      Simethicone (GAS-X PO)       triamcinolone (KENALOG) 0.1 % external cream Apply topically 2 times daily 30 g 1    Sennosides (SENOKOT PO)  (Patient not taking: Reported on 2023)           ALLERGIES:  Allergies   Allergen Reactions    Pneumococcal 13-Miranda Conj Vacc Rash         PAST MEDICAL HISTORY:  Hyperlipidemia    PAST SURGICAL HISTORY:  Past Surgical History:   Procedure Laterality Date    LUMPECTOMY BREAST Right 3/8/2023    Procedure: Radio Frequency Identification localized right lumpectomy;  Surgeon: Basil Calvillo MD;  Location:  OR         SOCIAL HISTORY:  Social History     Socioeconomic History    Marital status:      Spouse name: Not on file    Number of children: Not on file    Years of education: Not on file    Highest education level: Not on file   Occupational History    Not on file   Tobacco Use    Smoking status: Never    Smokeless tobacco: Never   Vaping Use    Vaping Use: Never used   Substance and Sexual Activity    Alcohol use: Not Currently    Drug use: Never    Sexual activity: Not on file   Other Topics Concern    Parent/sibling w/ CABG, MI or angioplasty before 65F 55M? Not Asked   Social History Narrative    Not on file     Social Determinants of Health     Financial Resource Strain: Not on file   Food Insecurity: Not on file   Transportation Needs: Not on file   Physical Activity: Not on file   Stress: Not on file   Social Connections: Not on file   Intimate Partner Violence: Not on file   Housing Stability: Not on file         FAMILY HISTORY:  No Family history of breast cancer, ovarian cancer pancreatic cancer    PHYSICAL EXAM:  Vital signs:  BP (!) 149/88   Pulse 98   Resp 16   Ht 1.524 m (5')   Wt 48.1 kg (106 lb)   SpO2 97%   BMI 20.70 kg/m     ECO  GENERAL/CONSTITUTIONAL: No acute distress.  EYES: Pupils are equal, round, and react to light and accommodation. Extraocular  movements intact.  No scleral icterus.  ENT/MOUTH: Neck supple. Oropharynx clear, no mucositis.  LYMPH: No anterior cervical, posterior cervical, supraclavicular, axillary or inguinal adenopathy.   RESPIRATORY: Clear to auscultation bilaterally. No crackles or wheezing.   CARDIOVASCULAR: Regular rate and rhythm without murmurs, gallops, or rubs.  GASTROINTESTINAL: No hepatosplenomegaly, masses, or tenderness. The patient has normal bowel sounds. No guarding.  No distention.  MUSCULOSKELETAL: Warm and well-perfused, no cyanosis, clubbing, or edema.  NEUROLOGIC: Cranial nerves II-XII are intact. Alert, oriented, answers questions appropriately.  INTEGUMENTARY: No rashes or jaundice.  GAIT: Steady, does not use assistive device  Bilateral breast exam is normal no evidence of infection.  Right breast status postlumpectomy area is healing very well.  radiation dermatitis has resolved      LABS:  None    PATHOLOGY:  Right breast, lumpectomy:  --Invasive ductal carcinoma with micropapillary features, Gilbert grade 2 (of 3), 2.0 cm.  --Admixed ductal carcinoma in situ (DCIS), nuclear grade 2 (of 3).  --Multiple foci of lymphovascular invasion are identified.  --Invasive carcinoma is located 0.2 cm from the closest posterior and anterior margins.  --DCIS is located 0.3 cm from the closest posterior margin.    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  Breast cancer: Observation alone.  See me in 6 months with labs and DEXA scan.  Patient does not want to see JOSE EDUARDO    2.  Rash resolved.  Collarbone appears normal    Total time spent on day of visit, including review of tests, obtaining/reviewing separately obtained history, ordering  medications/tests/procedures, communicating with PCP/consultants, and documenting in electronic medical record: 30 minutes

## 2023-08-07 NOTE — PROGRESS NOTES
Oncology Rooming Note    August 7, 2023 4:17 PM   Krupa Mora is a 87 year old female who presents for:    Chief Complaint   Patient presents with    Oncology Clinic Visit     Micropapillary carcinoma (H)     Initial Vitals: BP (!) 149/88   Pulse 98   Resp 16   Ht 1.524 m (5')   Wt 48.1 kg (106 lb)   SpO2 97%   BMI 20.70 kg/m   Estimated body mass index is 20.7 kg/m  as calculated from the following:    Height as of this encounter: 1.524 m (5').    Weight as of this encounter: 48.1 kg (106 lb). Body surface area is 1.43 meters squared.  No Pain (0) Comment: Data Unavailable   No LMP recorded. Patient is postmenopausal.  Allergies reviewed: Yes  Medications reviewed: Yes    Medications: Medication refills not needed today.  Pharmacy name entered into [id number] [place] [id number]    Clinical concerns: Krupa has a lump on her right clavicle that she would Like Dr. Kraft to see.       Nga Marsh MA

## 2023-08-07 NOTE — LETTER
8/7/2023         RE: Krupa Mora  [address]  [facility] [address]        Dear Colleague,    Thank you for referring your patient, Krupa Mora, to the [facility]. Please see a copy of my visit note below.    Oncology Rooming Note    August 7, 2023 4:17 PM   Krupa Mora is a 87 year old female who presents for:    Chief Complaint   Patient presents with     Oncology Clinic Visit     Micropapillary carcinoma (H)     Initial Vitals: BP (!) 149/88   Pulse 98   Resp 16   Ht 1.524 m (5')   Wt 48.1 kg (106 lb)   SpO2 97%   BMI 20.70 kg/m   Estimated body mass index is 20.7 kg/m  as calculated from the following:    Height as of this encounter: 1.524 m (5').    Weight as of this encounter: 48.1 kg (106 lb). Body surface area is 1.43 meters squared.  No Pain (0) Comment: Data Unavailable   No LMP recorded. Patient is postmenopausal.  Allergies reviewed: Yes  Medications reviewed: Yes    Medications: Medication refills not needed today.  Pharmacy name entered into [facility]: [id number] - [place], [place] - [address]    Clinical concerns: Krupa has a lump on her right clavicle that she would Like Dr. Kraft to see.       Nga Marsh MA                 [facility]    Hematology/Oncology New Patient Note      Today's Date:  August 7, 2023    Reason for Consult: Right breast cancer       HISTORY OF PRESENT ILLNESS: Krupa Mora is a 87 year old female who presents with the following oncology history    1.  Palpable lump in the right breast by PCP  2.Diagnostic mammogram and ultrasound showed a suspicious lesion in the right breast approximately 2 cm  3.  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  No significant family history of breast cancer  5.  Lumpectomy on 3/8/2023 final pathology showed invasive ductal carcinoma with micropapillary  features Gilbert grade 2 measuring 2 cm With associated DCIS grade  multiple foci of lymphovascular invasion margins negative  6  DEXA scan completed in 2019 showed a T score of -1.3  7 status post lumpectomy and radiation on surveillance alone    Krupa returns for follow-up today.  She had a chest wall rash that has resolved now but she wanted me to look at that.  She also feels like her right collarbone is more protuberant than the left when she wanted to make sure that that was normal        REVIEW OF SYSTEMS:   14 point ROS was reviewed and is negative other than as noted above in HPI.       HOME MEDICATIONS:  Current Outpatient Medications   Medication Sig Dispense Refill     atorvastatin (LIPITOR) 10 MG tablet Take 10 mg by mouth daily       calcium citrate-vitamin D (CITRACAL) 315-250 MG-UNIT TABS per tablet Take by mouth 2 times daily       Dimethicone 1.3 % CREA EXTERNALLY APPLY TOPICALLY 2 TIMES DAILY 92 g 1     ipratropium (ATROVENT) 0.06 % nasal spray Spray 2 sprays into both nostrils 4 times daily       Lidocaine-Menthol, Spray, 4-1 % LIQD Externally apply 1 spray topically 4 times daily as needed (radiation dermatitis) 118 mL 1     LORazepam (ATIVAN) 0.5 MG tablet Take 0.5 mg by mouth every 6 hours as needed for anxiety       MAGNESIUM GLYCINATE PO Take 2 tablets of 200mg each day       Simethicone (GAS-X PO)        triamcinolone (KENALOG) 0.1 % external cream Apply topically 2 times daily 30 g 1     Sennosides (SENOKOT PO)  (Patient not taking: Reported on 8/7/2023)           ALLERGIES:  Allergies   Allergen Reactions     Pneumococcal 13-Miranda Conj Vacc Rash         PAST MEDICAL HISTORY:  Hyperlipidemia    PAST SURGICAL HISTORY:  Past Surgical History:   Procedure Laterality Date     LUMPECTOMY BREAST Right 3/8/2023    Procedure: Radio Frequency Identification localized right lumpectomy;  Surgeon: Basil Calvillo MD;  Location:  OR         SOCIAL HISTORY:  Social History     Socioeconomic  History     Marital status:      Spouse name: Not on file     Number of children: Not on file     Years of education: Not on file     Highest education level: Not on file   Occupational History     Not on file   Tobacco Use     Smoking status: Never     Smokeless tobacco: Never   Vaping Use     Vaping Use: Never used   Substance and Sexual Activity     Alcohol use: Not Currently     Drug use: Never     Sexual activity: Not on file   Other Topics Concern     Parent/sibling w/ CABG, MI or angioplasty before 65F 55M? Not Asked   Social History Narrative     Not on file     Social Determinants of Health     Financial Resource Strain: Not on file   Food Insecurity: Not on file   Transportation Needs: Not on file   Physical Activity: Not on file   Stress: Not on file   Social Connections: Not on file   Intimate Partner Violence: Not on file   Housing Stability: Not on file         FAMILY HISTORY:  No Family history of breast cancer, ovarian cancer pancreatic cancer    PHYSICAL EXAM:  Vital signs:  BP (!) 149/88   Pulse 98   Resp 16   Ht 1.524 m (5')   Wt 48.1 kg (106 lb)   SpO2 97%   BMI 20.70 kg/m     ECO  GENERAL/CONSTITUTIONAL: No acute distress.  EYES: Pupils are equal, round, and react to light and accommodation. Extraocular movements intact.  No scleral icterus.  ENT/MOUTH: Neck supple. Oropharynx clear, no mucositis.  LYMPH: No anterior cervical, posterior cervical, supraclavicular, axillary or inguinal adenopathy.   RESPIRATORY: Clear to auscultation bilaterally. No crackles or wheezing.   CARDIOVASCULAR: Regular rate and rhythm without murmurs, gallops, or rubs.  GASTROINTESTINAL: No hepatosplenomegaly, masses, or tenderness. The patient has normal bowel sounds. No guarding.  No distention.  MUSCULOSKELETAL: Warm and well-perfused, no cyanosis, clubbing, or edema.  NEUROLOGIC: Cranial nerves II-XII are intact. Alert, oriented, answers questions appropriately.  INTEGUMENTARY: No rashes or  jaundice.  GAIT: Steady, does not use assistive device  Bilateral breast exam is normal no evidence of infection.  Right breast status postlumpectomy area is healing very well.  radiation dermatitis has resolved      LABS:  None    PATHOLOGY:  Right breast, lumpectomy:  --Invasive ductal carcinoma with micropapillary features, Gilbert grade 2 (of 3), 2.0 cm.  --Admixed ductal carcinoma in situ (DCIS), nuclear grade 2 (of 3).  --Multiple foci of lymphovascular invasion are identified.  --Invasive carcinoma is located 0.2 cm from the closest posterior and anterior margins.  --DCIS is located 0.3 cm from the closest posterior margin.    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  Breast cancer: Observation alone.  See me in 6 months with labs and DEXA scan.  Patient does not want to see JOSE EDUARDO    2.  Rash resolved.  Collarbone appears normal    Total time spent on day of visit, including review of tests, obtaining/reviewing separately obtained history, ordering medications/tests/procedures, communicating with PCP/consultants, and documenting in electronic medical record: 30 minutes            Again, thank you for allowing me to participate in the care of your patient.        Sincerely,        Winston Kraft MD

## 2023-09-26 ENCOUNTER — APPOINTMENT (OUTPATIENT)
Dept: URBAN - METROPOLITAN AREA CLINIC 255 | Age: 88
Setting detail: DERMATOLOGY
End: 2023-09-26

## 2023-09-26 PROBLEM — C44.311 BASAL CELL CARCINOMA OF SKIN OF NOSE: Status: ACTIVE | Noted: 2023-09-26

## 2023-09-26 PROCEDURE — OTHER MIPS QUALITY: OTHER

## 2023-09-26 PROCEDURE — 17311 MOHS 1 STAGE H/N/HF/G: CPT

## 2023-09-26 PROCEDURE — OTHER MOHS SURGERY: OTHER

## 2023-09-26 PROCEDURE — 13152 CMPLX RPR E/N/E/L 2.6-7.5 CM: CPT

## 2023-09-26 NOTE — PROCEDURE: MIPS QUALITY
Quality 110: Preventive Care And Screening: Influenza Immunization: Influenza Immunization previously received during influenza season
Quality 431: Preventive Care And Screening: Unhealthy Alcohol Use - Screening: Patient not identified as an unhealthy alcohol user when screened for unhealthy alcohol use using a systematic screening method
Detail Level: Detailed
Quality 143: Oncology: Medical And Radiation- Pain Intensity Quantified: Pain severity quantified, no pain present
Quality 130: Documentation Of Current Medications In The Medical Record: Current Medications Documented
Quality 111:Pneumonia Vaccination Status For Older Adults: Patient received any pneumococcal conjugate or polysaccharide vaccine on or after their 60th birthday and before the end of the measurement period
Quality 226: Preventive Care And Screening: Tobacco Use: Screening And Cessation Intervention: Patient screened for tobacco use and is an ex/non-smoker

## 2023-09-26 NOTE — PROCEDURE: MOHS SURGERY
Body Location Override (Optional - Billing Will Still Be Based On Selected Body Map Location If Applicable): Distal Nasal Dorsum

## 2023-09-26 NOTE — PROCEDURE: MOHS SURGERY
Spoke with patient and discussed lab results and MD recommendations. Doxycycline orders placed, instructions reviewed, and sent to patient's preferred pharmacy. Patient verbalizes understanding and denies questions or needs at this time.    Skin Substitute Text: The defect edges were debeveled with a #15 scalpel blade. Given the location of the defect, shape of the defect and the proximity to free margins a skin substitute graft was deemed most appropriate.  The graft material was trimmed to fit the size of the defect. The graft was then placed in the primary defect and oriented appropriately.

## 2023-10-03 ENCOUNTER — APPOINTMENT (OUTPATIENT)
Dept: URBAN - METROPOLITAN AREA CLINIC 255 | Age: 88
Setting detail: DERMATOLOGY
End: 2023-10-16

## 2023-10-03 DIAGNOSIS — Z48.02 ENCOUNTER FOR REMOVAL OF SUTURES: ICD-10-CM

## 2023-10-03 PROCEDURE — OTHER DIAGNOSIS COMMENT: OTHER

## 2023-10-03 PROCEDURE — OTHER SUTURE REMOVAL (GLOBAL PERIOD): OTHER

## 2023-10-03 PROCEDURE — OTHER RETURN TO REFERRING PROVIDER: OTHER

## 2023-10-03 PROCEDURE — OTHER MIPS QUALITY: OTHER

## 2023-10-03 PROCEDURE — 99024 POSTOP FOLLOW-UP VISIT: CPT

## 2023-10-03 PROCEDURE — OTHER COUNSELING: OTHER

## 2023-10-03 ASSESSMENT — LOCATION DETAILED DESCRIPTION DERM: LOCATION DETAILED: NASAL DORSUM

## 2023-10-03 ASSESSMENT — LOCATION ZONE DERM: LOCATION ZONE: NOSE

## 2023-10-03 ASSESSMENT — LOCATION SIMPLE DESCRIPTION DERM: LOCATION SIMPLE: NOSE

## 2023-10-03 NOTE — PROCEDURE: SUTURE REMOVAL (GLOBAL PERIOD)
Detail Level: Detailed
Add 38971 Cpt? (Important Note: In 2017 The Use Of 89768 Is Being Tracked By Cms To Determine Future Global Period Reimbursement For Global Periods): no

## 2023-10-03 NOTE — PROCEDURE: DIAGNOSIS COMMENT
Comment: S/P MMS for a Primary Nodular BCC on (09/26/23)
Render Risk Assessment In Note?: no
Detail Level: Simple

## 2023-11-30 DIAGNOSIS — Z13.9 SCREENING FOR CONDITION: Primary | ICD-10-CM

## 2023-12-01 ENCOUNTER — APPOINTMENT (OUTPATIENT)
Dept: URBAN - METROPOLITAN AREA CLINIC 255 | Age: 88
Setting detail: DERMATOLOGY
End: 2023-12-02

## 2023-12-01 DIAGNOSIS — L70.8 OTHER ACNE: ICD-10-CM

## 2023-12-01 PROCEDURE — OTHER EXTRACTIONS: OTHER

## 2023-12-01 PROCEDURE — OTHER MIPS QUALITY: OTHER

## 2023-12-01 PROCEDURE — 99213 OFFICE O/P EST LOW 20 MIN: CPT

## 2023-12-01 PROCEDURE — OTHER COUNSELING: OTHER

## 2023-12-01 ASSESSMENT — LOCATION DETAILED DESCRIPTION DERM: LOCATION DETAILED: NASAL DORSUM

## 2023-12-01 ASSESSMENT — LOCATION ZONE DERM: LOCATION ZONE: NOSE

## 2023-12-01 ASSESSMENT — LOCATION SIMPLE DESCRIPTION DERM: LOCATION SIMPLE: NOSE

## 2023-12-01 NOTE — PROCEDURE: EXTRACTIONS
Acne Type: Comedonal Lesions
Extraction Method: 22 gauge needle
Render Post-Care Instructions In Note?: no
Post-Care Instructions: I reviewed with the patient in detail post-care instructions. Patient is to keep the treatment areas dry overnight, and then apply bacitracin twice daily until healed. Patient may apply hydrogen peroxide soaks to remove any crusting.
Prep Text (Optional): Prior to removal the treatment areas were prepped in the usual fashion.
Consent was obtained and risks were reviewed including but not limited to scarring, infection, bleeding, scabbing, incomplete removal, and allergy to anesthesia.
Detail Level: Detailed

## 2023-12-04 ENCOUNTER — LAB (OUTPATIENT)
Dept: LAB | Facility: CLINIC | Age: 88
End: 2023-12-04
Payer: MEDICARE

## 2023-12-04 ENCOUNTER — HOSPITAL ENCOUNTER (OUTPATIENT)
Dept: BONE DENSITY | Facility: CLINIC | Age: 88
Discharge: HOME | End: 2023-12-04
Attending: INTERNAL MEDICINE | Admitting: INTERNAL MEDICINE
Payer: MEDICARE

## 2023-12-04 DIAGNOSIS — Z13.9 SCREENING FOR CONDITION: ICD-10-CM

## 2023-12-04 DIAGNOSIS — R21 RASH: ICD-10-CM

## 2023-12-04 DIAGNOSIS — C80.1 MICROPAPILLARY CARCINOMA (H): ICD-10-CM

## 2023-12-04 DIAGNOSIS — M85.80 OSTEOPENIA, UNSPECIFIED LOCATION: ICD-10-CM

## 2023-12-04 DIAGNOSIS — M85.89 OTHER SPECIFIED DISORDERS OF BONE DENSITY AND STRUCTURE, MULTIPLE SITES: ICD-10-CM

## 2023-12-04 LAB
CHOLEST SERPL-MCNC: 194 MG/DL
HDLC SERPL-MCNC: 75 MG/DL
LDLC SERPL CALC-MCNC: 103 MG/DL
NONHDLC SERPL-MCNC: 119 MG/DL
TRIGL SERPL-MCNC: 78 MG/DL

## 2023-12-04 PROCEDURE — 36415 COLL VENOUS BLD VENIPUNCTURE: CPT | Mod: GZ

## 2023-12-04 PROCEDURE — 77080 DXA BONE DENSITY AXIAL: CPT

## 2023-12-04 PROCEDURE — 80061 LIPID PANEL: CPT | Mod: GZ

## 2023-12-06 ENCOUNTER — LAB (OUTPATIENT)
Dept: INFUSION THERAPY | Facility: CLINIC | Age: 88
End: 2023-12-06
Attending: INTERNAL MEDICINE
Payer: MEDICARE

## 2023-12-06 DIAGNOSIS — C50.612 MALIGNANT NEOPLASM OF AXILLARY TAIL OF LEFT BREAST IN FEMALE, ESTROGEN RECEPTOR POSITIVE (H): ICD-10-CM

## 2023-12-06 DIAGNOSIS — Z17.0 MALIGNANT NEOPLASM OF AXILLARY TAIL OF LEFT BREAST IN FEMALE, ESTROGEN RECEPTOR POSITIVE (H): ICD-10-CM

## 2023-12-06 DIAGNOSIS — C80.1 MICROPAPILLARY CARCINOMA (H): ICD-10-CM

## 2023-12-06 DIAGNOSIS — M85.80 OSTEOPENIA, UNSPECIFIED LOCATION: ICD-10-CM

## 2023-12-06 LAB
ALBUMIN SERPL BCG-MCNC: 4.3 G/DL (ref 3.5–5.2)
ALP SERPL-CCNC: 112 U/L (ref 40–150)
ALT SERPL W P-5'-P-CCNC: 24 U/L (ref 0–50)
ANION GAP SERPL CALCULATED.3IONS-SCNC: 11 MMOL/L (ref 7–15)
AST SERPL W P-5'-P-CCNC: 31 U/L (ref 0–45)
BASOPHILS # BLD AUTO: 0 10E3/UL (ref 0–0.2)
BASOPHILS NFR BLD AUTO: 1 %
BILIRUB SERPL-MCNC: 0.5 MG/DL
BUN SERPL-MCNC: 20 MG/DL (ref 8–23)
CALCIUM SERPL-MCNC: 9.9 MG/DL (ref 8.8–10.2)
CHLORIDE SERPL-SCNC: 105 MMOL/L (ref 98–107)
CREAT SERPL-MCNC: 0.65 MG/DL (ref 0.51–0.95)
DEPRECATED HCO3 PLAS-SCNC: 26 MMOL/L (ref 22–29)
EGFRCR SERPLBLD CKD-EPI 2021: 84 ML/MIN/1.73M2
EOSINOPHIL # BLD AUTO: 0.1 10E3/UL (ref 0–0.7)
EOSINOPHIL NFR BLD AUTO: 1 %
ERYTHROCYTE [DISTWIDTH] IN BLOOD BY AUTOMATED COUNT: 13.5 % (ref 10–15)
GLUCOSE SERPL-MCNC: 87 MG/DL (ref 70–99)
HCT VFR BLD AUTO: 45.5 % (ref 35–47)
HGB BLD-MCNC: 15 G/DL (ref 11.7–15.7)
IMM GRANULOCYTES # BLD: 0 10E3/UL
IMM GRANULOCYTES NFR BLD: 0 %
LYMPHOCYTES # BLD AUTO: 1.1 10E3/UL (ref 0.8–5.3)
LYMPHOCYTES NFR BLD AUTO: 19 %
MCH RBC QN AUTO: 31.6 PG (ref 26.5–33)
MCHC RBC AUTO-ENTMCNC: 33 G/DL (ref 31.5–36.5)
MCV RBC AUTO: 96 FL (ref 78–100)
MONOCYTES # BLD AUTO: 0.7 10E3/UL (ref 0–1.3)
MONOCYTES NFR BLD AUTO: 11 %
NEUTROPHILS # BLD AUTO: 4 10E3/UL (ref 1.6–8.3)
NEUTROPHILS NFR BLD AUTO: 68 %
NRBC # BLD AUTO: 0 10E3/UL
NRBC BLD AUTO-RTO: 0 /100
PLATELET # BLD AUTO: 219 10E3/UL (ref 150–450)
POTASSIUM SERPL-SCNC: 4.3 MMOL/L (ref 3.4–5.3)
PROT SERPL-MCNC: 7.3 G/DL (ref 6.4–8.3)
RBC # BLD AUTO: 4.74 10E6/UL (ref 3.8–5.2)
SODIUM SERPL-SCNC: 142 MMOL/L (ref 135–145)
WBC # BLD AUTO: 5.9 10E3/UL (ref 4–11)

## 2023-12-06 PROCEDURE — 36415 COLL VENOUS BLD VENIPUNCTURE: CPT

## 2023-12-06 PROCEDURE — 86300 IMMUNOASSAY TUMOR CA 15-3: CPT | Performed by: INTERNAL MEDICINE

## 2023-12-06 PROCEDURE — 82378 CARCINOEMBRYONIC ANTIGEN: CPT | Performed by: INTERNAL MEDICINE

## 2023-12-06 PROCEDURE — 85014 HEMATOCRIT: CPT | Performed by: INTERNAL MEDICINE

## 2023-12-06 PROCEDURE — 80053 COMPREHEN METABOLIC PANEL: CPT | Performed by: INTERNAL MEDICINE

## 2023-12-06 NOTE — PROGRESS NOTES
Medical Assistant Note:  Krupa Mora presents today for blood draw.    Patient seen by provider today: No.   present during visit today: Not Applicable.    Concerns: No Concerns.    Procedure:  Lab draw site: lac, Needle type: bf, Gauge: 23.    Post Assessment:  Labs drawn without difficulty: Yes.    Discharge Plan:  Departure Mode: Ambulatory.    Face to Face Time: 5 min.    Selene Nogueira, CMA

## 2023-12-07 LAB
CANCER AG15-3 SERPL-ACNC: 30 U/ML
CEA SERPL-MCNC: 6.7 NG/ML

## 2023-12-11 ENCOUNTER — ONCOLOGY VISIT (OUTPATIENT)
Dept: ONCOLOGY | Facility: CLINIC | Age: 88
End: 2023-12-11
Attending: INTERNAL MEDICINE
Payer: MEDICARE

## 2023-12-11 VITALS
RESPIRATION RATE: 16 BRPM | WEIGHT: 107.6 LBS | HEART RATE: 101 BPM | SYSTOLIC BLOOD PRESSURE: 144 MMHG | DIASTOLIC BLOOD PRESSURE: 88 MMHG | HEIGHT: 62 IN | OXYGEN SATURATION: 97 % | BODY MASS INDEX: 19.8 KG/M2

## 2023-12-11 DIAGNOSIS — Z17.0 MALIGNANT NEOPLASM OF AXILLARY TAIL OF LEFT BREAST IN FEMALE, ESTROGEN RECEPTOR POSITIVE (H): ICD-10-CM

## 2023-12-11 DIAGNOSIS — R97.8 ELEVATED TUMOR MARKERS: Primary | ICD-10-CM

## 2023-12-11 DIAGNOSIS — C80.1 MICROPAPILLARY CARCINOMA (H): ICD-10-CM

## 2023-12-11 DIAGNOSIS — C50.612 MALIGNANT NEOPLASM OF AXILLARY TAIL OF LEFT BREAST IN FEMALE, ESTROGEN RECEPTOR POSITIVE (H): ICD-10-CM

## 2023-12-11 DIAGNOSIS — Z12.31 ENCOUNTER FOR SCREENING MAMMOGRAM FOR MALIGNANT NEOPLASM OF BREAST: ICD-10-CM

## 2023-12-11 PROCEDURE — G0463 HOSPITAL OUTPT CLINIC VISIT: HCPCS | Performed by: INTERNAL MEDICINE

## 2023-12-11 PROCEDURE — 99215 OFFICE O/P EST HI 40 MIN: CPT | Performed by: INTERNAL MEDICINE

## 2023-12-11 ASSESSMENT — PAIN SCALES - GENERAL: PAINLEVEL: NO PAIN (0)

## 2023-12-11 NOTE — LETTER
12/11/2023         RE: Krupa Mora  [address]  [facility] [address]        Dear Colleague,    Thank you for referring your patient, Krupa Mora, to the [facility]. Please see a copy of my visit note below.       [facility]    Hematology/Oncology return patient note      Today's Date:  dec 11 2023  Reason for Consult: Right breast cancer       HISTORY OF PRESENT ILLNESS: Krupa Mora is a 88 year old female who presents with the following oncology history    1.  Palpable lump in the right breast by PCP  2.Diagnostic mammogram and ultrasound showed a suspicious lesion in the right breast approximately 2 cm  3.  Biopsy obtained she was found to have micropapillary carcinoma ER/[place] positive HER2/logan negative lymph node involvement  4.  No significant family history of breast cancer  5.  Lumpectomy on 3/8/2023 final pathology showed invasive ductal carcinoma with micropapillary features Max grade 2 measuring 2 cm With associated DCIS grade  multiple foci of lymphovascular invasion margins negative  6  DEXA scan completed in 2019 showed a T score of -1.3  7 status post lumpectomy and radiation on surveillance alone    Krupa returns for follow-up today.   Elevated markers         REVIEW OF SYSTEMS:   14 point ROS was reviewed and is negative other than as noted above in HPI.       HOME MEDICATIONS:  Current Outpatient Medications   Medication Sig Dispense Refill     atorvastatin (LIPITOR) 10 MG tablet Take 10 mg by mouth daily       calcium citrate-vitamin D (CITRACAL) 315-250 MG-UNIT TABS per tablet Take by mouth 2 times daily       Dimethicone 1.3 % CREA EXTERNALLY APPLY TOPICALLY 2 TIMES DAILY 92 g 1     ipratropium (ATROVENT) 0.06 % nasal spray Spray 2 sprays into both nostrils 4 times daily       Lidocaine-Menthol, Spray, 4-1 % LIQD Externally apply 1 spray topically 4 times daily as needed (radiation dermatitis) 118 mL 1      LORazepam (ATIVAN) 0.5 MG tablet Take 0.5 mg by mouth every 6 hours as needed for anxiety       MAGNESIUM GLYCINATE PO Take 2 tablets of 200mg each day       Sennosides (SENOKOT PO)  (Patient not taking: Reported on 2023)       Simethicone (GAS-X PO)        triamcinolone (KENALOG) 0.1 % external cream Apply topically 2 times daily 30 g 1         ALLERGIES:  Allergies   Allergen Reactions     Pneumococcal 13-Miranda Conj Vacc Rash         PAST MEDICAL HISTORY:  Hyperlipidemia    PAST SURGICAL HISTORY:  Past Surgical History:   Procedure Laterality Date     LUMPECTOMY BREAST Right 3/8/2023    Procedure: Radio Frequency Identification localized right lumpectomy;  Surgeon: Basil Calvillo MD;  Location:  OR         SOCIAL HISTORY:  Social History     Socioeconomic History     Marital status:      Spouse name: Not on file     Number of children: Not on file     Years of education: Not on file     Highest education level: Not on file   Occupational History     Not on file   Tobacco Use     Smoking status: Never     Smokeless tobacco: Never   Vaping Use     Vaping Use: Never used   Substance and Sexual Activity     Alcohol use: Not Currently     Drug use: Never     Sexual activity: Not on file   Other Topics Concern     Parent/sibling w/ CABG, MI or angioplasty before 65F 55M? Not Asked   Social History Narrative     Not on file     Social Determinants of Health     Financial Resource Strain: Not on file   Food Insecurity: Not on file   Transportation Needs: Not on file   Physical Activity: Not on file   Stress: Not on file   Social Connections: Not on file   Interpersonal Safety: Not on file   Housing Stability: Not on file         FAMILY HISTORY:  No Family history of breast cancer, ovarian cancer pancreatic cancer    PHYSICAL EXAM:  Vital signs:  There were no vitals taken for this visit.   ECO  GENERAL/CONSTITUTIONAL: No acute distress.  EYES: Pupils are equal, round, and react to light and  accommodation. Extraocular movements intact.  No scleral icterus.  ENT/MOUTH: Neck supple. Oropharynx clear, no mucositis.  LYMPH: No anterior cervical, posterior cervical, supraclavicular, axillary or inguinal adenopathy.   RESPIRATORY: Clear to auscultation bilaterally. No crackles or wheezing.   CARDIOVASCULAR: Regular rate and rhythm without murmurs, gallops, or rubs.  GASTROINTESTINAL: No hepatosplenomegaly, masses, or tenderness. The patient has normal bowel sounds. No guarding.  No distention.  MUSCULOSKELETAL: Warm and well-perfused, no cyanosis, clubbing, or edema.  NEUROLOGIC: Cranial nerves II-XII are intact. Alert, oriented, answers questions appropriately.  INTEGUMENTARY: No rashes or jaundice.  GAIT: Steady, does not use assistive device  Bilateral breast exam is normal        LABS:  None    PATHOLOGY:  Right breast, lumpectomy:  --Invasive ductal carcinoma with micropapillary features, Gilbert grade 2 (of 3), 2.0 cm.  --Admixed ductal carcinoma in situ (DCIS), nuclear grade 2 (of 3).  --Multiple foci of lymphovascular invasion are identified.  --Invasive carcinoma is located 0.2 cm from the closest posterior and anterior margins.  --DCIS is located 0.3 cm from the closest posterior margin.    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  Breast cancer: Observation alone. Markers elevated ,repeat in2 mo after discussion ,she wants to hold off on the pet. Mammogram  2 dexa mild osteopenia T score of -1.4 observe     Total time spent on day of visit, including review of tests, obtaining/reviewing separately obtained history, ordering medications/tests/procedures, communicating with PCP/consultants, and documenting in electronic medical  "record: 40minutes            Oncology Rooming Note    December 11, 2023 10:51 AM   Krupa Mora is a 88 year old female who presents for:    Chief Complaint   Patient presents with     Oncology Clinic Visit     Oncology Diagnoses (1)       Initial Vitals: BP (!) 144/88   Pulse 101   Resp 16   Ht 1.575 m (5' 2\")   Wt 48.8 kg (107 lb 9.6 oz)   SpO2 97%   BMI 19.68 kg/m   Estimated body mass index is 19.68 kg/m  as calculated from the following:    Height as of this encounter: 1.575 m (5' 2\").    Weight as of this encounter: 48.8 kg (107 lb 9.6 oz). Body surface area is 1.46 meters squared.  No Pain (0) Comment: Data Unavailable   No LMP recorded. Patient is postmenopausal.  Allergies reviewed: Yes  Medications reviewed: Yes    Medications: Medication refills not needed today.  Pharm[facility] into [id number] [place] [address]    Clinical concerns: None       Nga Marsh MA                Again, thank you for allowing me to participate in the care of your patient.        Sincerely,        Winston Kraft MD  "

## 2023-12-11 NOTE — PROGRESS NOTES
[facility]    Hematology/Oncology return patient note      Today's Date:  dec 11 2023  Reason for Consult: Right breast cancer       HISTORY OF PRESENT ILLNESS: Krupa Mora is a 88 year old female who presents with the following oncology history    1.  Palpable lump in the right breast by PCP  2.Diagnostic mammogram and ultrasound showed a suspicious lesion in the right breast approximately 2 cm  3.  Biopsy obtained she was found to have micropapillary carcinoma ER/[place] positive HER2/logan negative lymph node involvement  4.  No significant family history of breast cancer  5.  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   Elevated markers         REVIEW OF SYSTEMS:   14 point ROS was reviewed and is negative other than as noted above in HPI.       HOME MEDICATIONS:  Current Outpatient Medications   Medication Sig Dispense Refill    atorvastatin (LIPITOR) 10 MG tablet Take 10 mg by mouth daily      calcium citrate-vitamin D (CITRACAL) 315-250 MG-UNIT TABS per tablet Take by mouth 2 times daily      Dimethicone 1.3 % CREA EXTERNALLY APPLY TOPICALLY 2 TIMES DAILY 92 g 1    ipratropium (ATROVENT) 0.06 % nasal spray Spray 2 sprays into both nostrils 4 times daily      Lidocaine-Menthol, Spray, 4-1 % LIQD Externally apply 1 spray topically 4 times daily as needed (radiation dermatitis) 118 mL 1    LORazepam (ATIVAN) 0.5 MG tablet Take 0.5 mg by mouth every 6 hours as needed for anxiety      MAGNESIUM GLYCINATE PO Take 2 tablets of 200mg each day      Sennosides (SENOKOT PO)  (Patient not taking: Reported on 8/7/2023)      Simethicone (GAS-X PO)        triamcinolone (KENALOG) 0.1 % external cream Apply topically 2 times daily 30 g 1         ALLERGIES:  Allergies   Allergen Reactions    Pneumococcal 13-Miranda Conj Vacc Rash         PAST MEDICAL HISTORY:  Hyperlipidemia    PAST SURGICAL HISTORY:  Past Surgical History:   Procedure Laterality Date    LUMPECTOMY BREAST Right 3/8/2023    Procedure: Radio Frequency Identification localized right lumpectomy;  Surgeon: Basil Calvillo MD;  Location:  OR         SOCIAL HISTORY:  Social History     Socioeconomic History    Marital status:      Spouse name: Not on file    Number of children: Not on file    Years of education: Not on file    Highest education level: Not on file   Occupational History    Not on file   Tobacco Use    Smoking status: Never    Smokeless tobacco: Never   Vaping Use    Vaping Use: Never used   Substance and Sexual Activity    Alcohol use: Not Currently    Drug use: Never    Sexual activity: Not on file   Other Topics Concern    Parent/sibling w/ CABG, MI or angioplasty before 65F 55M? Not Asked   Social History Narrative    Not on file     Social Determinants of Health     Financial Resource Strain: Not on file   Food Insecurity: Not on file   Transportation Needs: Not on file   Physical Activity: Not on file   Stress: Not on file   Social Connections: Not on file   Interpersonal Safety: Not on file   Housing Stability: Not on file         FAMILY HISTORY:  No Family history of breast cancer, ovarian cancer pancreatic cancer    PHYSICAL EXAM:  Vital signs:  There were no vitals taken for this visit.   ECO  GENERAL/CONSTITUTIONAL: No acute distress.  EYES: Pupils are equal, round, and react to light and accommodation. Extraocular movements intact.  No scleral icterus.  ENT/MOUTH: Neck supple. Oropharynx clear, no mucositis.  LYMPH: No anterior cervical, posterior cervical, supraclavicular, axillary or inguinal adenopathy.   RESPIRATORY: Clear to auscultation bilaterally. No crackles  or wheezing.   CARDIOVASCULAR: Regular rate and rhythm without murmurs, gallops, or rubs.  GASTROINTESTINAL: No hepatosplenomegaly, masses, or tenderness. The patient has normal bowel sounds. No guarding.  No distention.  MUSCULOSKELETAL: Warm and well-perfused, no cyanosis, clubbing, or edema.  NEUROLOGIC: Cranial nerves II-XII are intact. Alert, oriented, answers questions appropriately.  INTEGUMENTARY: No rashes or jaundice.  GAIT: Steady, does not use assistive device  Bilateral breast exam is normal        LABS:  None    PATHOLOGY:  Right breast, lumpectomy:  --Invasive ductal carcinoma with micropapillary features, [place] grade 2 (of 3), 2.0 cm.  --Admixed ductal carcinoma in situ (DCIS), nuclear grade 2 (of 3).  --Multiple foci of lymphovascular invasion are identified.  --Invasive carcinoma is located 0.2 cm from the closest posterior and anterior margins.  --DCIS is located 0.3 cm from the closest posterior margin.    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  Breast cancer: Observation alone. Markers elevated ,repeat in2 mo after discussion ,she wants to hold off on the pet. Mammogram  2 dexa mild osteopenia T score of -1.4 observe     Total time spent on day of visit, including review of tests, obtaining/reviewing separately obtained history, ordering medications/tests/procedures, communicating with PCP/consultants, and documenting in electronic medical record: 40minutes

## 2023-12-11 NOTE — PROGRESS NOTES
"Oncology Rooming Note    December 11, 2023 10:51 AM   Krupa Mora is a 88 year old female who presents for:    Chief Complaint   Patient presents with    Oncology Clinic Visit     Oncology Diagnoses (1)       Initial Vitals: BP (!) 144/88   Pulse 101   Resp 16   Ht 1.575 m (5' 2\")   Wt 48.8 kg (107 lb 9.6 oz)   SpO2 97%   BMI 19.68 kg/m   Estimated body mass index is 19.68 kg/m  as calculated from the following:    Height as of this encounter: 1.575 m (5' 2\").    Weight as of this encounter: 48.8 kg (107 lb 9.6 oz). Body surface area is 1.46 meters squared.  No Pain (0) Comment: Data Unavailable   No LMP recorded. Patient is postmenopausal.  Allergies reviewed: Yes  Medications reviewed: Yes    Medications: Medication refills not needed today.  Pharmacy name entered into [employer]: [id number] [place] [id number]    Clinical concerns: None       Nga Marsh MA              "

## 2024-02-14 ENCOUNTER — LAB (OUTPATIENT)
Dept: INFUSION THERAPY | Facility: CLINIC | Age: 89
End: 2024-02-14
Attending: INTERNAL MEDICINE
Payer: MEDICARE

## 2024-02-14 ENCOUNTER — ANCILLARY PROCEDURE (OUTPATIENT)
Dept: MAMMOGRAPHY | Facility: CLINIC | Age: 89
End: 2024-02-14
Attending: INTERNAL MEDICINE
Payer: MEDICARE

## 2024-02-14 DIAGNOSIS — Z17.0 MALIGNANT NEOPLASM OF AXILLARY TAIL OF LEFT BREAST IN FEMALE, ESTROGEN RECEPTOR POSITIVE (H): ICD-10-CM

## 2024-02-14 DIAGNOSIS — R97.8 ELEVATED TUMOR MARKERS: ICD-10-CM

## 2024-02-14 DIAGNOSIS — Z12.31 ENCOUNTER FOR SCREENING MAMMOGRAM FOR MALIGNANT NEOPLASM OF BREAST: ICD-10-CM

## 2024-02-14 DIAGNOSIS — C80.1 MICROPAPILLARY CARCINOMA (H): ICD-10-CM

## 2024-02-14 DIAGNOSIS — C50.612 MALIGNANT NEOPLASM OF AXILLARY TAIL OF LEFT BREAST IN FEMALE, ESTROGEN RECEPTOR POSITIVE (H): ICD-10-CM

## 2024-02-14 LAB
CANCER AG15-3 SERPL-ACNC: 27 U/ML
CEA SERPL-MCNC: 6 NG/ML

## 2024-02-14 PROCEDURE — 82378 CARCINOEMBRYONIC ANTIGEN: CPT | Performed by: INTERNAL MEDICINE

## 2024-02-14 PROCEDURE — 86300 IMMUNOASSAY TUMOR CA 15-3: CPT | Performed by: INTERNAL MEDICINE

## 2024-02-14 PROCEDURE — 36415 COLL VENOUS BLD VENIPUNCTURE: CPT

## 2024-02-14 PROCEDURE — 77063 BREAST TOMOSYNTHESIS BI: CPT | Mod: TC | Performed by: RADIOLOGY

## 2024-02-14 PROCEDURE — 77067 SCR MAMMO BI INCL CAD: CPT | Mod: TC | Performed by: RADIOLOGY

## 2024-02-14 NOTE — PROGRESS NOTES
Medical Assistant Note:  Krupa Mora presents today for blood draw.    Patient seen by provider today: No.   present during visit today: Not Applicable.    Concerns: No Concerns.    Procedure:  Lab draw site: rac, Needle type: bf, Gauge: 23.    Post Assessment:  Labs drawn without difficulty: Yes.    Discharge Plan:  Departure Mode: Ambulatory.    Face to Face Time: 5 min.    Selene Nogueira, CMA

## 2024-02-19 ENCOUNTER — ONCOLOGY VISIT (OUTPATIENT)
Dept: ONCOLOGY | Facility: CLINIC | Age: 89
End: 2024-02-19
Attending: INTERNAL MEDICINE
Payer: MEDICARE

## 2024-02-19 VITALS
HEART RATE: 101 BPM | RESPIRATION RATE: 16 BRPM | WEIGHT: 105.6 LBS | DIASTOLIC BLOOD PRESSURE: 91 MMHG | HEIGHT: 62 IN | OXYGEN SATURATION: 95 % | BODY MASS INDEX: 19.43 KG/M2 | SYSTOLIC BLOOD PRESSURE: 155 MMHG

## 2024-02-19 DIAGNOSIS — Z17.0 MALIGNANT NEOPLASM OF AXILLARY TAIL OF LEFT BREAST IN FEMALE, ESTROGEN RECEPTOR POSITIVE (H): ICD-10-CM

## 2024-02-19 DIAGNOSIS — C50.612 MALIGNANT NEOPLASM OF AXILLARY TAIL OF LEFT BREAST IN FEMALE, ESTROGEN RECEPTOR POSITIVE (H): ICD-10-CM

## 2024-02-19 DIAGNOSIS — R97.8 ELEVATED TUMOR MARKERS: ICD-10-CM

## 2024-02-19 DIAGNOSIS — C80.1 MICROPAPILLARY CARCINOMA (H): ICD-10-CM

## 2024-02-19 PROCEDURE — G0463 HOSPITAL OUTPT CLINIC VISIT: HCPCS | Performed by: INTERNAL MEDICINE

## 2024-02-19 PROCEDURE — 99214 OFFICE O/P EST MOD 30 MIN: CPT | Performed by: INTERNAL MEDICINE

## 2024-02-19 ASSESSMENT — PAIN SCALES - GENERAL: PAINLEVEL: NO PAIN (0)

## 2024-02-19 NOTE — PROGRESS NOTES
[facility]    Hematology/Oncology return patient note    Today's Date:  Feb 19, 2024     Reason for Consult: Right breast cancer       HISTORY OF PRESENT ILLNESS: Krupa Mora is a 88 year old female who presents with the following oncology history    1.  Palpable lump in the right breast by PCP  2.Diagnostic mammogram and ultrasound showed a suspicious lesion in the right breast approximately 2 cm  3.  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  No significant family history of breast cancer  5.  Lumpectomy on 3/8/2023 final pathology showed invasive ductal carcinoma with micropapillary features Rankin grade 2 measuring 2 cm With associated DCIS grade  multiple foci of lymphovascular invasion margins negative  6  DEXA scan completed in 2019 showed a T score of -1.3  7 status post lumpectomy and radiation on surveillance alone    Krupa returns for follow-up today.  Rising markers have improved we did a recheck in 2 months  . She feels well. Mammogram in Feb is normal.  She was disappointed with her mammogram experience as it was uncomfortable for her when they did certain images        REVIEW OF SYSTEMS:   14 point ROS was reviewed and is negative other than as noted above in HPI.       HOME MEDICATIONS:  Current Outpatient Medications   Medication Sig Dispense Refill    atorvastatin (LIPITOR) 10 MG tablet Take 10 mg by mouth daily      calcium citrate-vitamin D (CITRACAL) 315-250 MG-UNIT TABS per tablet Take by mouth 2 times daily      Dimethicone 1.3 % CREA EXTERNALLY APPLY TOPICALLY 2 TIMES DAILY 92 g 1    ipratropium (ATROVENT) 0.06 % nasal spray Spray 2 sprays into both nostrils 4 times daily      Lidocaine-Menthol, Spray, 4-1 % LIQD Externally apply 1 spray topically 4 times daily as needed (radiation dermatitis) 118 mL 1    LORazepam (ATIVAN) 0.5 MG tablet Take 0.5 mg by mouth every 6 hours  as needed for anxiety      MAGNESIUM GLYCINATE PO Take 2 tablets of 200mg each day      Sennosides (SENOKOT PO)       Simethicone (GAS-X PO)       triamcinolone (KENALOG) 0.1 % external cream Apply topically 2 times daily 30 g 1         ALLERGIES:  Allergies   Allergen Reactions    Pneumococcal 13-Miranda Conj Vacc Rash         PAST MEDICAL HISTORY:  Hyperlipidemia    PAST SURGICAL HISTORY:  Past Surgical History:   Procedure Laterality Date    LUMPECTOMY BREAST Right 3/8/2023    Procedure: Radio Frequency Identification localized right lumpectomy;  Surgeon: Basil Calvillo MD;  Location:  OR         SOCIAL HISTORY:  Social History     Socioeconomic History    Marital status:      Spouse name: Not on file    Number of children: Not on file    Years of education: Not on file    Highest education level: Not on file   Occupational History    Not on file   Tobacco Use    Smoking status: Never    Smokeless tobacco: Never   Vaping Use    Vaping Use: Never used   Substance and Sexual Activity    Alcohol use: Not Currently    Drug use: Never    Sexual activity: Not on file   Other Topics Concern    Parent/sibling w/ CABG, MI or angioplasty before 65F 55M? Not Asked   Social History Narrative    Not on file     Social Determinants of Health     Financial Resource Strain: Not on file   Food Insecurity: Not on file   Transportation Needs: Not on file   Physical Activity: Not on file   Stress: Not on file   Social Connections: Not on file   Interpersonal Safety: Not on file   Housing Stability: Not on file         FAMILY HISTORY:  No Family history of breast cancer, ovarian cancer pancreatic cancer    PHYSICAL EXAM:  Vital signs:  There were no vitals taken for this visit.   ECO  GENERAL/CONSTITUTIONAL: No acute distress.  EYES: Pupils are equal, round, and react to light and accommodation. Extraocular movements intact.  No scleral icterus.  ENT/MOUTH: Neck supple. Oropharynx clear, no mucositis.  LYMPH: No  anterior cervical, posterior cervical, supraclavicular, axillary or inguinal adenopathy.   RESPIRATORY: Clear to auscultation bilaterally. No crackles or wheezing.   CARDIOVASCULAR: Regular rate and rhythm without murmurs, gallops, or rubs.  GASTROINTESTINAL: No hepatosplenomegaly, masses, or tenderness. The patient has normal bowel sounds. No guarding.  No distention.  MUSCULOSKELETAL: Warm and well-perfused, no cyanosis, clubbing, or edema.  NEUROLOGIC: Cranial nerves II-XII are intact. Alert, oriented, answers questions appropriately.  INTEGUMENTARY: No rashes or jaundice.  GAIT: Steady, does not use assistive device  Bilateral breast exam is normal        LABS:  None    PATHOLOGY:  Right breast, lumpectomy:  --Invasive ductal carcinoma with micropapillary features, Gilbert grade 2 (of 3), 2.0 cm.  --Admixed ductal carcinoma in situ (DCIS), nuclear grade 2 (of 3).  --Multiple foci of lymphovascular invasion are identified.  --Invasive carcinoma is located 0.2 cm from the closest posterior and anterior margins.  --DCIS is located 0.3 cm from the closest posterior margin.    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  Breast cancer: Observation alone. Markers stable, recheck in 6 months   2 dexa mild osteopenia T score of -1.4 observe     Total time spent on day of visit, including review of tests, obtaining/reviewing separately obtained history, ordering medications/tests/procedures, communicating with PCP/consultants, and documenting in electronic medical record: 30  minutes

## 2024-02-19 NOTE — PROGRESS NOTES
"Oncology Rooming Note    February 19, 2024 3:57 PM   Krupa Mora is a 88 year old female who presents for:    Chief Complaint   Patient presents with    Oncology Clinic Visit     Micropapillary carcinoma (H)     Initial Vitals: BP (!) 155/91   Pulse 101   Resp 16   Ht 1.575 m (5' 2\")   SpO2 95%   BMI 19.68 kg/m   Estimated body mass index is 19.68 kg/m  as calculated from the following:    Height as of this encounter: 1.575 m (5' 2\").    Weight as of 12/11/23: 48.8 kg (107 lb 9.6 oz). Body surface area is 1.46 meters squared.  No Pain (0) Comment: Data Unavailable   No LMP recorded. Patient is postmenopausal.  Allergies reviewed: Yes  Medications reviewed: Yes    Medications: Medication refills not needed today.  [facility] [id number] [place] [address] [facility]    Frailty Screening:   Is the patient here for a new oncology consult visit in cancer care? 2. No      Clinical concerns: None       Nga Marsh MA              "

## 2024-02-19 NOTE — LETTER
2/19/2024         RE: Krupa Mora  [address]  [facility] [address]        Dear Colleague,    Thank you for referring your patient, Krupa Mora, to the [facility]. Please see a copy of my visit note below.       [facility]    Hematology/Oncology return patient note    Today's Date:  Feb 19, 2024     Reason for Consult: Right breast cancer       HISTORY OF PRESENT ILLNESS: Krupa Mora is a 88 year old female who presents with the following oncology history    1.  Palpable lump in the right breast by PCP  2.Diagnostic mammogram and ultrasound showed a suspicious lesion in the right breast approximately 2 cm  3.  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  No significant family history of breast cancer  5.  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  Rising markers have improved we did a recheck in 2 months  . She feels well. Mammogram in Feb is normal.  She was disappointed with her mammogram experience as it was uncomfortable for her when they did certain images        REVIEW OF SYSTEMS:   14 point ROS was reviewed and is negative other than as noted above in HPI.       HOME MEDICATIONS:  Current Outpatient Medications   Medication Sig Dispense Refill     atorvastatin (LIPITOR) 10 MG tablet Take 10 mg by mouth daily       calcium citrate-vitamin D (CITRACAL) 315-250 MG-UNIT TABS per tablet Take by mouth 2 times daily       Dimethicone 1.3 % CREA EXTERNALLY APPLY TOPICALLY 2 TIMES DAILY 92 g 1     ipratropium (ATROVENT)  0.06 % nasal spray Spray 2 sprays into both nostrils 4 times daily       Lidocaine-Menthol, Spray, 4-1 % LIQD Externally apply 1 spray topically 4 times daily as needed (radiation dermatitis) 118 mL 1     LORazepam (ATIVAN) 0.5 MG tablet Take 0.5 mg by mouth every 6 hours as needed for anxiety       MAGNESIUM GLYCINATE PO Take 2 tablets of 200mg each day       Sennosides (SENOKOT PO)        Simethicone (GAS-X PO)        triamcinolone (KENALOG) 0.1 % external cream Apply topically 2 times daily 30 g 1         ALLERGIES:  Allergies   Allergen Reactions     Pneumococcal 13-Miranda Conj Vacc Rash         PAST MEDICAL HISTORY:  Hyperlipidemia    PAST SURGICAL HISTORY:  Past Surgical History:   Procedure Laterality Date     LUMPECTOMY BREAST Right 3/8/2023    Procedure: Radio Frequency Identification localized right lumpectomy;  Surgeon: Basil Calvillo MD;  Location:  OR         SOCIAL HISTORY:  Social History     Socioeconomic History     Marital status:      Spouse name: Not on file     Number of children: Not on file     Years of education: Not on file     Highest education level: Not on file   Occupational History     Not on file   Tobacco Use     Smoking status: Never     Smokeless tobacco: Never   Vaping Use     Vaping Use: Never used   Substance and Sexual Activity     Alcohol use: Not Currently     Drug use: Never     Sexual activity: Not on file   Other Topics Concern     Parent/sibling w/ CABG, MI or angioplasty before 65F 55M? Not Asked   Social History Narrative     Not on file     Social Determinants of Health     Financial Resource Strain: Not on file   Food Insecurity: Not on file   Transportation Needs: Not on file   Physical Activity: Not on file   Stress: Not on file   Social Connections: Not on file   Interpersonal Safety: Not on file   Housing Stability: Not on file         FAMILY HISTORY:  No Family history of breast cancer, ovarian cancer pancreatic cancer    PHYSICAL EXAM:  Vital signs:   There were no vitals taken for this visit.   ECO  GENERAL/CONSTITUTIONAL: No acute distress.  EYES: Pupils are equal, round, and react to light and accommodation. Extraocular movements intact.  No scleral icterus.  ENT/MOUTH: Neck supple. Oropharynx clear, no mucositis.  LYMPH: No anterior cervical, posterior cervical, supraclavicular, axillary or inguinal adenopathy.   RESPIRATORY: Clear to auscultation bilaterally. No crackles or wheezing.   CARDIOVASCULAR: Regular rate and rhythm without murmurs, gallops, or rubs.  GASTROINTESTINAL: No hepatosplenomegaly, masses, or tenderness. The patient has normal bowel sounds. No guarding.  No distention.  MUSCULOSKELETAL: Warm and well-perfused, no cyanosis, clubbing, or edema.  NEUROLOGIC: Cranial nerves II-XII are intact. Alert, oriented, answers questions appropriately.  INTEGUMENTARY: No rashes or jaundice.  GAIT: Steady, does not use assistive device  Bilateral breast exam is normal        LABS:  None    PATHOLOGY:  Right breast, lumpectomy:  --Invasive ductal carcinoma with micropapillary features, Shipman grade 2 (of 3), 2.0 cm.  --Admixed ductal carcinoma in situ (DCIS), nuclear grade 2 (of 3).  --Multiple foci of lymphovascular invasion are identified.  --Invasive carcinoma is located 0.2 cm from the closest posterior and anterior margins.  --DCIS is located 0.3 cm from the closest posterior margin.    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  Breast cancer: Observation alone. Markers stable, recheck in 6 months   2 dexa mild osteopenia T score of -1.4 observe     Total time spent on day of visit, including review of tests, obtaining/reviewing separately obtained history, ordering  "medications/tests/procedures, communicating with PCP/consultants, and documenting in electronic medical record: 30  minutes            Oncology Rooming Note    February 19, 2024 3:57 PM   Krupa Mora is a 88 year old female who presents for:    Chief Complaint   Patient presents with     Oncology Clinic Visit     Micropapillary carcinoma (H)     Initial Vitals: BP (!) 155/91   Pulse 101   Resp 16   Ht 1.575 m (5' 2\")   SpO2 95%   BMI 19.68 kg/m   Estimated body mass index is 19.68 kg/m  as calculated from the following:    Height as of this encounter: 1.575 m (5' 2\").    Weight as of 12/11/23: 48.8 kg (107 lb 9.6 oz). Body surface area is 1.46 meters squared.  No Pain (0) Comment: Data Unavailable   No LMP recorded. Patient is postmenopausal.  Allergies reviewed: Yes  Medications reviewed: Yes    Medications: Medication refills not needed today.  Pharm[facility] [id number] [place] [address]    Frailty Screening:   Is the patient here for a new oncology consult visit in cancer care? 2. No      Clinical concerns: None       Nga Marsh MA                Again, thank you for allowing me to participate in the care of your patient.        Sincerely,        Winston Kraft MD  "

## 2024-02-22 ENCOUNTER — MEDICAL CORRESPONDENCE (OUTPATIENT)
Dept: HEALTH INFORMATION MANAGEMENT | Facility: CLINIC | Age: 89
End: 2024-02-22
Payer: MEDICARE

## 2024-04-08 ENCOUNTER — APPOINTMENT (OUTPATIENT)
Dept: URBAN - METROPOLITAN AREA CLINIC 256 | Age: 89
Setting detail: DERMATOLOGY
End: 2024-04-09

## 2024-04-08 VITALS — HEIGHT: 62 IN | WEIGHT: 106 LBS

## 2024-04-08 DIAGNOSIS — H61.03 CHONDRITIS OF EXTERNAL EAR: ICD-10-CM

## 2024-04-08 DIAGNOSIS — D18.0 HEMANGIOMA: ICD-10-CM

## 2024-04-08 DIAGNOSIS — L82.1 OTHER SEBORRHEIC KERATOSIS: ICD-10-CM

## 2024-04-08 DIAGNOSIS — L57.8 OTHER SKIN CHANGES DUE TO CHRONIC EXPOSURE TO NONIONIZING RADIATION: ICD-10-CM

## 2024-04-08 DIAGNOSIS — D22 MELANOCYTIC NEVI: ICD-10-CM

## 2024-04-08 DIAGNOSIS — L57.0 ACTINIC KERATOSIS: ICD-10-CM

## 2024-04-08 DIAGNOSIS — I87.2 VENOUS INSUFFICIENCY (CHRONIC) (PERIPHERAL): ICD-10-CM

## 2024-04-08 DIAGNOSIS — Z71.89 OTHER SPECIFIED COUNSELING: ICD-10-CM

## 2024-04-08 DIAGNOSIS — Z85.828 PERSONAL HISTORY OF OTHER MALIGNANT NEOPLASM OF SKIN: ICD-10-CM

## 2024-04-08 DIAGNOSIS — L72.0 EPIDERMAL CYST: ICD-10-CM

## 2024-04-08 PROBLEM — D22.72 MELANOCYTIC NEVI OF LEFT LOWER LIMB, INCLUDING HIP: Status: ACTIVE | Noted: 2024-04-08

## 2024-04-08 PROBLEM — D22.39 MELANOCYTIC NEVI OF OTHER PARTS OF FACE: Status: ACTIVE | Noted: 2024-04-08

## 2024-04-08 PROBLEM — D22.61 MELANOCYTIC NEVI OF RIGHT UPPER LIMB, INCLUDING SHOULDER: Status: ACTIVE | Noted: 2024-04-08

## 2024-04-08 PROBLEM — D22.62 MELANOCYTIC NEVI OF LEFT UPPER LIMB, INCLUDING SHOULDER: Status: ACTIVE | Noted: 2024-04-08

## 2024-04-08 PROBLEM — D22.5 MELANOCYTIC NEVI OF TRUNK: Status: ACTIVE | Noted: 2024-04-08

## 2024-04-08 PROBLEM — H61.032 CHONDRITIS OF LEFT EXTERNAL EAR: Status: ACTIVE | Noted: 2024-04-08

## 2024-04-08 PROBLEM — D18.01 HEMANGIOMA OF SKIN AND SUBCUTANEOUS TISSUE: Status: ACTIVE | Noted: 2024-04-08

## 2024-04-08 PROBLEM — D22.71 MELANOCYTIC NEVI OF RIGHT LOWER LIMB, INCLUDING HIP: Status: ACTIVE | Noted: 2024-04-08

## 2024-04-08 PROCEDURE — 99214 OFFICE O/P EST MOD 30 MIN: CPT | Mod: 25

## 2024-04-08 PROCEDURE — 17003 DESTRUCT PREMALG LES 2-14: CPT

## 2024-04-08 PROCEDURE — 17000 DESTRUCT PREMALG LESION: CPT

## 2024-04-08 PROCEDURE — OTHER LIQUID NITROGEN: OTHER

## 2024-04-08 PROCEDURE — OTHER MIPS QUALITY: OTHER

## 2024-04-08 PROCEDURE — OTHER COUNSELING: OTHER

## 2024-04-08 ASSESSMENT — LOCATION DETAILED DESCRIPTION DERM
LOCATION DETAILED: LEFT INFERIOR CENTRAL MALAR CHEEK
LOCATION DETAILED: RIGHT DISTAL POSTERIOR THIGH
LOCATION DETAILED: LEFT ANTERIOR PROXIMAL THIGH
LOCATION DETAILED: RIGHT DISTAL PRETIBIAL REGION
LOCATION DETAILED: LEFT DISTAL POSTERIOR THIGH
LOCATION DETAILED: INFERIOR MID FOREHEAD
LOCATION DETAILED: LEFT INFERIOR MEDIAL MIDBACK
LOCATION DETAILED: LEFT PROXIMAL PRETIBIAL REGION
LOCATION DETAILED: LEFT LATERAL ABDOMEN
LOCATION DETAILED: RIGHT DISTAL POSTERIOR UPPER ARM
LOCATION DETAILED: LEFT ANTIHELIX
LOCATION DETAILED: RIGHT VENTRAL DISTAL FOREARM
LOCATION DETAILED: LEFT SUPERIOR NASAL CHEEK
LOCATION DETAILED: LEFT DISTAL POSTERIOR UPPER ARM
LOCATION DETAILED: LEFT VENTRAL PROXIMAL FOREARM
LOCATION DETAILED: NASAL DORSUM
LOCATION DETAILED: RIGHT ANTERIOR DISTAL THIGH

## 2024-04-08 ASSESSMENT — LOCATION SIMPLE DESCRIPTION DERM
LOCATION SIMPLE: LEFT UPPER ARM
LOCATION SIMPLE: LEFT THIGH
LOCATION SIMPLE: RIGHT UPPER ARM
LOCATION SIMPLE: LEFT PRETIBIAL REGION
LOCATION SIMPLE: LEFT EAR
LOCATION SIMPLE: NOSE
LOCATION SIMPLE: RIGHT POSTERIOR THIGH
LOCATION SIMPLE: LEFT LOWER BACK
LOCATION SIMPLE: INFERIOR FOREHEAD
LOCATION SIMPLE: RIGHT FOREARM
LOCATION SIMPLE: ABDOMEN
LOCATION SIMPLE: LEFT CHEEK
LOCATION SIMPLE: RIGHT THIGH
LOCATION SIMPLE: LEFT POSTERIOR THIGH
LOCATION SIMPLE: RIGHT PRETIBIAL REGION
LOCATION SIMPLE: LEFT FOREARM

## 2024-04-08 ASSESSMENT — LOCATION ZONE DERM
LOCATION ZONE: LEG
LOCATION ZONE: NOSE
LOCATION ZONE: EAR
LOCATION ZONE: ARM
LOCATION ZONE: FACE
LOCATION ZONE: TRUNK

## 2024-04-08 NOTE — PROCEDURE: LIQUID NITROGEN
Duration Of Freeze Thaw-Cycle (Seconds): 3
Render Note In Bullet Format When Appropriate: No
Show Applicator Variable?: Yes
Number Of Freeze-Thaw Cycles: 1 freeze-thaw cycle
Detail Level: Simple
Consent: The patient's verbal consent was obtained including but not limited to risks of crusting, scabbing, blistering, scarring, darker or lighter pigmentary change, recurrence, incomplete removal and infection.
Post-Care Instructions: I reviewed with the patient in detail post-care instructions. Patient is to wear sunprotection, and avoid picking at any of the treated lesions. Pt may apply Vaseline to crusted or scabbing areas.

## 2024-04-08 NOTE — PROCEDURE: COUNSELING
Detail Level: Zone
Patient Specific Counseling (Will Not Stick From Patient To Patient): Extended counseling and reassurance needed today
Detail Level: Detailed
Detail Level: Simple
Prescription Strength Graduated Compression Stockings Recommendations: The patient was counseled that prescription strength graduated compression stockings should be worn for all waking hours. They will follow up with a venous specialist to monitor graduated compression stocking usage and their symptoms.

## 2024-05-20 ENCOUNTER — APPOINTMENT (OUTPATIENT)
Dept: URBAN - METROPOLITAN AREA CLINIC 256 | Age: 89
Setting detail: DERMATOLOGY
End: 2024-05-21

## 2024-05-20 DIAGNOSIS — D49.2 NEOPLASM OF UNSPECIFIED BEHAVIOR OF BONE, SOFT TISSUE, AND SKIN: ICD-10-CM

## 2024-05-20 DIAGNOSIS — L82.0 INFLAMED SEBORRHEIC KERATOSIS: ICD-10-CM

## 2024-05-20 DIAGNOSIS — L82.1 OTHER SEBORRHEIC KERATOSIS: ICD-10-CM

## 2024-05-20 DIAGNOSIS — L57.8 OTHER SKIN CHANGES DUE TO CHRONIC EXPOSURE TO NONIONIZING RADIATION: ICD-10-CM

## 2024-05-20 DIAGNOSIS — L57.0 ACTINIC KERATOSIS: ICD-10-CM

## 2024-05-20 PROCEDURE — OTHER MIPS QUALITY: OTHER

## 2024-05-20 PROCEDURE — 17110 DESTRUCT B9 LESION 1-14: CPT

## 2024-05-20 PROCEDURE — 99213 OFFICE O/P EST LOW 20 MIN: CPT | Mod: 25

## 2024-05-20 PROCEDURE — OTHER LIQUID NITROGEN: OTHER

## 2024-05-20 PROCEDURE — OTHER MONITORING: OTHER

## 2024-05-20 PROCEDURE — OTHER ADDITIONAL NOTES: OTHER

## 2024-05-20 PROCEDURE — OTHER COUNSELING: OTHER

## 2024-05-20 ASSESSMENT — LOCATION SIMPLE DESCRIPTION DERM
LOCATION SIMPLE: LEFT SHOULDER
LOCATION SIMPLE: SUPERIOR FOREHEAD
LOCATION SIMPLE: RIGHT LIP
LOCATION SIMPLE: RIGHT ELBOW
LOCATION SIMPLE: INFERIOR FOREHEAD

## 2024-05-20 ASSESSMENT — LOCATION ZONE DERM
LOCATION ZONE: LIP
LOCATION ZONE: FACE
LOCATION ZONE: ARM

## 2024-05-20 ASSESSMENT — LOCATION DETAILED DESCRIPTION DERM
LOCATION DETAILED: LEFT ANTERIOR SHOULDER
LOCATION DETAILED: RIGHT UPPER CUTANEOUS LIP
LOCATION DETAILED: RIGHT ELBOW
LOCATION DETAILED: INFERIOR MID FOREHEAD
LOCATION DETAILED: SUPERIOR MID FOREHEAD

## 2024-05-20 NOTE — PROCEDURE: COUNSELING
Detail Level: Detailed
Patient Specific Counseling (Will Not Stick From Patient To Patient): Extended counseling and reassurance needed today
Detail Level: Zone

## 2024-05-20 NOTE — PROCEDURE: LIQUID NITROGEN
Add 52 Modifier (Optional): no
Number Of Freeze-Thaw Cycles: 2 freeze-thaw cycles
Spray Paint Text: The liquid nitrogen was applied to the skin utilizing a spray paint frosting technique.
Consent: The patient's consent was obtained including but not limited to risks of crusting, scabbing, blistering, scarring, darker or lighter pigmentary change, recurrence, incomplete removal and infection.
Medical Necessity Clause: This procedure was medically necessary because the lesions that were treated were:
Application Tool (Optional): Liquid Nitrogen Sprayer
Show Spray Paint Technique Variable?: Yes
Detail Level: Detailed
Duration Of Freeze Thaw-Cycle (Seconds): 5-10
Post-Care Instructions: I reviewed with the patient in detail post-care instructions. Patient is to wear sunprotection, and avoid picking at any of the treated lesions. Pt may apply Vaseline to crusted or scabbing areas.
Medical Necessity Information: It is in your best interest to select a reason for this procedure from the list below. All of these items fulfill various CMS LCD requirements except the new and changing color options.

## 2024-05-20 NOTE — PROCEDURE: ADDITIONAL NOTES
Additional Notes: Assured AKs treated at her last visit appear resolved today.
Detail Level: Simple
Render Risk Assessment In Note?: no

## 2024-10-08 ENCOUNTER — LAB (OUTPATIENT)
Dept: INFUSION THERAPY | Facility: CLINIC | Age: 89
End: 2024-10-08
Attending: INTERNAL MEDICINE
Payer: MEDICARE

## 2024-10-08 DIAGNOSIS — Z17.0 MALIGNANT NEOPLASM OF AXILLARY TAIL OF LEFT BREAST IN FEMALE, ESTROGEN RECEPTOR POSITIVE (H): ICD-10-CM

## 2024-10-08 DIAGNOSIS — C50.612 MALIGNANT NEOPLASM OF AXILLARY TAIL OF LEFT BREAST IN FEMALE, ESTROGEN RECEPTOR POSITIVE (H): ICD-10-CM

## 2024-10-08 DIAGNOSIS — C80.1 MICROPAPILLARY CARCINOMA (H): ICD-10-CM

## 2024-10-08 DIAGNOSIS — R97.8 ELEVATED TUMOR MARKERS: ICD-10-CM

## 2024-10-08 LAB
ALBUMIN SERPL BCG-MCNC: 4.1 G/DL (ref 3.5–5.2)
ALP SERPL-CCNC: 101 U/L (ref 40–150)
ALT SERPL W P-5'-P-CCNC: 27 U/L (ref 0–50)
ANION GAP SERPL CALCULATED.3IONS-SCNC: 10 MMOL/L (ref 7–15)
AST SERPL W P-5'-P-CCNC: 28 U/L (ref 0–45)
BASOPHILS # BLD AUTO: 0 10E3/UL (ref 0–0.2)
BASOPHILS NFR BLD AUTO: 1 %
BILIRUB SERPL-MCNC: 0.5 MG/DL
BUN SERPL-MCNC: 20.1 MG/DL (ref 8–23)
CALCIUM SERPL-MCNC: 10 MG/DL (ref 8.8–10.4)
CHLORIDE SERPL-SCNC: 106 MMOL/L (ref 98–107)
CREAT SERPL-MCNC: 0.7 MG/DL (ref 0.51–0.95)
EGFRCR SERPLBLD CKD-EPI 2021: 82 ML/MIN/1.73M2
EOSINOPHIL # BLD AUTO: 0.1 10E3/UL (ref 0–0.7)
EOSINOPHIL NFR BLD AUTO: 1 %
ERYTHROCYTE [DISTWIDTH] IN BLOOD BY AUTOMATED COUNT: 13.7 % (ref 10–15)
GLUCOSE SERPL-MCNC: 100 MG/DL (ref 70–99)
HCO3 SERPL-SCNC: 26 MMOL/L (ref 22–29)
HCT VFR BLD AUTO: 44.2 % (ref 35–47)
HGB BLD-MCNC: 14.6 G/DL (ref 11.7–15.7)
IMM GRANULOCYTES # BLD: 0 10E3/UL
IMM GRANULOCYTES NFR BLD: 0 %
LYMPHOCYTES # BLD AUTO: 1.2 10E3/UL (ref 0.8–5.3)
LYMPHOCYTES NFR BLD AUTO: 20 %
MCH RBC QN AUTO: 31.1 PG (ref 26.5–33)
MCHC RBC AUTO-ENTMCNC: 33 G/DL (ref 31.5–36.5)
MCV RBC AUTO: 94 FL (ref 78–100)
MONOCYTES # BLD AUTO: 0.7 10E3/UL (ref 0–1.3)
MONOCYTES NFR BLD AUTO: 11 %
NEUTROPHILS # BLD AUTO: 4.3 10E3/UL (ref 1.6–8.3)
NEUTROPHILS NFR BLD AUTO: 68 %
NRBC # BLD AUTO: 0 10E3/UL
NRBC BLD AUTO-RTO: 0 /100
PLATELET # BLD AUTO: 217 10E3/UL (ref 150–450)
POTASSIUM SERPL-SCNC: 4.2 MMOL/L (ref 3.4–5.3)
PROT SERPL-MCNC: 7 G/DL (ref 6.4–8.3)
RBC # BLD AUTO: 4.69 10E6/UL (ref 3.8–5.2)
SODIUM SERPL-SCNC: 142 MMOL/L (ref 135–145)
WBC # BLD AUTO: 6.3 10E3/UL (ref 4–11)

## 2024-10-08 PROCEDURE — 36415 COLL VENOUS BLD VENIPUNCTURE: CPT

## 2024-10-08 PROCEDURE — 82378 CARCINOEMBRYONIC ANTIGEN: CPT | Performed by: INTERNAL MEDICINE

## 2024-10-08 PROCEDURE — 85025 COMPLETE CBC W/AUTO DIFF WBC: CPT | Performed by: INTERNAL MEDICINE

## 2024-10-08 PROCEDURE — 86300 IMMUNOASSAY TUMOR CA 15-3: CPT | Performed by: INTERNAL MEDICINE

## 2024-10-08 PROCEDURE — 82435 ASSAY OF BLOOD CHLORIDE: CPT | Performed by: INTERNAL MEDICINE

## 2024-10-09 LAB
CANCER AG15-3 SERPL-ACNC: 27 U/ML
CEA SERPL-MCNC: 6.2 NG/ML

## 2024-10-15 ENCOUNTER — ONCOLOGY VISIT (OUTPATIENT)
Dept: ONCOLOGY | Facility: CLINIC | Age: 89
End: 2024-10-15
Attending: INTERNAL MEDICINE
Payer: MEDICARE

## 2024-10-15 ENCOUNTER — LAB (OUTPATIENT)
Dept: INFUSION THERAPY | Facility: CLINIC | Age: 89
End: 2024-10-15
Attending: INTERNAL MEDICINE
Payer: MEDICARE

## 2024-10-15 VITALS
BODY MASS INDEX: 19.88 KG/M2 | WEIGHT: 108 LBS | HEIGHT: 62 IN | OXYGEN SATURATION: 96 % | DIASTOLIC BLOOD PRESSURE: 78 MMHG | SYSTOLIC BLOOD PRESSURE: 150 MMHG | HEART RATE: 87 BPM | RESPIRATION RATE: 16 BRPM

## 2024-10-15 DIAGNOSIS — Z12.31 ENCOUNTER FOR SCREENING MAMMOGRAM FOR MALIGNANT NEOPLASM OF BREAST: ICD-10-CM

## 2024-10-15 DIAGNOSIS — Z17.0 MALIGNANT NEOPLASM OF AXILLARY TAIL OF LEFT BREAST IN FEMALE, ESTROGEN RECEPTOR POSITIVE (H): ICD-10-CM

## 2024-10-15 DIAGNOSIS — C50.612 MALIGNANT NEOPLASM OF AXILLARY TAIL OF LEFT BREAST IN FEMALE, ESTROGEN RECEPTOR POSITIVE (H): ICD-10-CM

## 2024-10-15 DIAGNOSIS — R97.8 ELEVATED TUMOR MARKERS: Primary | ICD-10-CM

## 2024-10-15 PROCEDURE — G0008 ADMIN INFLUENZA VIRUS VAC: HCPCS | Performed by: INTERNAL MEDICINE

## 2024-10-15 PROCEDURE — 250N000011 HC RX IP 250 OP 636: Performed by: INTERNAL MEDICINE

## 2024-10-15 PROCEDURE — G0463 HOSPITAL OUTPT CLINIC VISIT: HCPCS | Performed by: INTERNAL MEDICINE

## 2024-10-15 PROCEDURE — 99214 OFFICE O/P EST MOD 30 MIN: CPT | Performed by: INTERNAL MEDICINE

## 2024-10-15 PROCEDURE — 90662 IIV NO PRSV INCREASED AG IM: CPT | Performed by: INTERNAL MEDICINE

## 2024-10-15 RX ADMIN — INFLUENZA A VIRUS A/VICTORIA/4897/2022 IVR-238 (H1N1) ANTIGEN (FORMALDEHYDE INACTIVATED), INFLUENZA A VIRUS A/CALIFORNIA/122/2022 SAN-022 (H3N2) ANTIGEN (FORMALDEHYDE INACTIVATED), AND INFLUENZA B VIRUS B/MICHIGAN/01/2021 ANTIGEN (FORMALDEHYDE INACTIVATED) 0.5 ML: 60; 60; 60 INJECTION, SUSPENSION INTRAMUSCULAR at 15:23

## 2024-10-15 ASSESSMENT — PAIN SCALES - GENERAL: PAINLEVEL: NO PAIN (0)

## 2024-10-15 NOTE — PROGRESS NOTES
[facility]     Hematology/Oncology return patient note    Today's Date:  October 15, 2024     Reason for Consult: Right breast cancer       HISTORY OF PRESENT ILLNESS: Krupa Mora is a 88 year old female who presents with the following oncology history    1.  Palpable lump in the right breast by PCP  2.Diagnostic mammogram and ultrasound showed a suspicious lesion in the right breast approximately 2 cm  3.  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  No significant family history of breast cancer  5.  Lumpectomy on 3/8/2023 final pathology showed invasive ductal carcinoma with micropapillary features [place] grade 2 measuring 2 cm With associated DCIS grade  multiple foci of lymphovascular invasion margins negative  6  DEXA scan completed in 2019 showed a T score of -1.3  7 status post lumpectomy and radiation on surveillance alone    Krupa returns for follow-up today.   She feels well. Markers are normal has a left cervical neck strain        REVIEW OF SYSTEMS:   14 point ROS was reviewed and is negative other than as noted above in HPI.       HOME MEDICATIONS:  Current Outpatient Medications   Medication Sig Dispense Refill    atorvastatin (LIPITOR) 10 MG tablet Take 10 mg by mouth daily      calcium citrate-vitamin D (CITRACAL) 315-250 MG-UNIT TABS per tablet Take by mouth 2 times daily      Dimethicone 1.3 % CREA EXTERNALLY APPLY TOPICALLY 2 TIMES DAILY 92 g 1    ipratropium (ATROVENT) 0.06 % nasal spray Spray 2 sprays into both nostrils 4 times daily      Lidocaine-Menthol, Spray, 4-1 % LIQD Externally apply 1 spray topically 4 times daily as needed (radiation dermatitis) 118 mL 1    LORazepam (ATIVAN) 0.5 MG tablet Take 0.5 mg by mouth every 6 hours as needed for anxiety      MAGNESIUM GLYCINATE PO Take 2 tablets of 200mg each day      Sennosides (SENOKOT PO)       Simethicone (GAS-X PO)       triamcinolone  (KENALOG) 0.1 % external cream Apply topically 2 times daily 30 g 1         ALLERGIES:  Allergies   Allergen Reactions    Pneumococcal 13-Miranda Conj Vacc Rash         PAST MEDICAL HISTORY:  Hyperlipidemia    PAST SURGICAL HISTORY:  Past Surgical History:   Procedure Laterality Date    LUMPECTOMY BREAST Right 3/8/2023    Procedure: Radio Frequency Identification localized right lumpectomy;  Surgeon: Basil Calvillo MD;  Location:  OR         SOCIAL HISTORY:  Social History     Socioeconomic History    Marital status:      Spouse name: Not on file    Number of children: Not on file    Years of education: Not on file    Highest education level: Not on file   Occupational History    Not on file   Tobacco Use    Smoking status: Never    Smokeless tobacco: Never   Vaping Use    Vaping status: Never Used   Substance and Sexual Activity    Alcohol use: Not Currently    Drug use: Never    Sexual activity: Not on file   Other Topics Concern    Parent/sibling w/ CABG, MI or angioplasty before 65F 55M? Not Asked   Social History Narrative    Not on file     Social Determinants of Health     Financial Resource Strain: Low Risk  (10/2/2022)    [facility], [facility]    Financial Resource Strain     Difficulty of Paying Living Expenses: 3     Difficulty of Paying Living Expenses: Not on file   Food Insecurity: No Food Insecurity (10/2/2022)    Received from [facility], [facility]    Food Insecurity     Worried About Running Out of Food in the Last Year: 1   Transportation Needs: No Transportation Needs (10/2/2022)    Received from [facility], [facility]    Transportation Needs     Lack of Transportation (Medical): 1   Physical Activity: Not on file   Stress: Not on file   Social Connections: Unknown  "(10/10/2023)    [facility], [facility]    Social Connections     Frequency of Communication with Friends and Family: Not on file   Interpersonal Safety: Not on file   Housing Stability: Low Risk  (10/2/2022)    [facility], [facility]    Housing Stability     Unable to Pay for Housing in the Last Year: 1         FAMILY HISTORY:  No Family history of breast cancer, ovarian cancer pancreatic cancer    PHYSICAL EXAM:  Vital signs:  BP (!) 150/78 (BP Location: Right arm, Patient Position: Sitting, Cuff Size: Adult Regular)   Pulse 87   Resp 16   Ht 1.575 m (5' 2\")   Wt 49 kg (108 lb)   SpO2 96%   BMI 19.75 kg/m     ECO  GENERAL/CONSTITUTIONAL: No acute distress.  EYES: Pupils are equal, round, and react to light and accommodation. Extraocular movements intact.  No scleral icterus.  ENT/MOUTH: Neck supple. Oropharynx clear, no mucositis.  LYMPH: No anterior cervical, posterior cervical, supraclavicular, axillary or inguinal adenopathy.   RESPIRATORY: Clear to auscultation bilaterally. No crackles or wheezing.   CARDIOVASCULAR: Regular rate and rhythm without murmurs, gallops, or rubs.  GASTROINTESTINAL: No hepatosplenomegaly, masses, or tenderness. The patient has normal bowel sounds. No guarding.  No distention.  MUSCULOSKELETAL: left cervical neck strain  NEUROLOGIC: Cranial nerves II-XII are intact. Alert, oriented, answers questions appropriately.  INTEGUMENTARY: No rashes or jaundice.  GAIT: Steady, does not use assistive device  Bilateral breast exam is normal        LABS:  noted    PATHOLOGY:  Right breast, lumpectomy:  --Invasive ductal carcinoma with micropapillary features, [place] grade 2 (of 3), 2.0 cm.  --Admixed ductal carcinoma in situ (DCIS), nuclear grade 2 (of 3).  --Multiple foci of lymphovascular invasion are " identified.  --Invasive carcinoma is located 0.2 cm from the closest posterior and anterior margins.  --DCIS is located 0.3 cm from the closest posterior margin.    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  Breast cancer: Observation alone. Markers stable, recheck in 6 months per patient request I am ok with her going yearly    2 dexa mild osteopenia T score of -1.4 observe     3 neck strain: right cervical neck. Refuses PT, will try magnesium lotion    Total time spent on day of visit, including review of tests, obtaining/reviewing separately obtained history, ordering medications/tests/procedures, communicating with PCP/consultants, and documenting in electronic medical record: 3minutes

## 2024-10-15 NOTE — PROGRESS NOTES
"Oncology Rooming Note    October 15, 2024 2:50 PM   Krupa Mora is a 89 year old female who presents for:    Chief Complaint   Patient presents with    Oncology Clinic Visit     Micropapillary carcinoma (H)     Initial Vitals: BP (!) 150/78 (BP Location: Right arm, Patient Position: Sitting, Cuff Size: Adult Regular)   Pulse 87   Resp 16   Ht 1.575 m (5' 2\")   Wt 49 kg (108 lb)   SpO2 96%   BMI 19.75 kg/m   Estimated body mass index is 19.75 kg/m  as calculated from the following:    Height as of this encounter: 1.575 m (5' 2\").    Weight as of this encounter: 49 kg (108 lb). Body surface area is 1.46 meters squared.  No Pain (0) Comment: Data Unavailable   No LMP recorded. Patient is postmenopausal.  Allergies reviewed: Yes  Medications reviewed: Yes    Medications: Medication refills not needed today.  Pharmacy name entered into [id number] [place] [address]    Frailty Screening:   Is the patient here for a new oncology consult visit in cancer care? 2. No      Clinical concerns: None       Nga Marsh MA            "

## 2024-10-15 NOTE — LETTER
"10/15/2024      Krupa Mora  [address]  [place] [address]      Dear Colleague,    Thank you for referring your patient, Krupa Mora, to the [facility]. Please see a copy of my visit note below.    Oncology Rooming Note    October 15, 2024 2:50 PM   Krupa Mora is a 89 year old female who presents for:    Chief Complaint   Patient presents with     Oncology Clinic Visit     Micropapillary carcinoma (H)     Initial Vitals: BP (!) 150/78 (BP Location: Right arm, Patient Position: Sitting, Cuff Size: Adult Regular)   Pulse 87   Resp 16   Ht 1.575 m (5' 2\")   Wt 49 kg (108 lb)   SpO2 96%   BMI 19.75 kg/m   Estimated body mass index is 19.75 kg/m  as calculated from the following:    Height as of this encounter: 1.575 m (5' 2\").    Weight as of this encounter: 49 kg (108 lb). Body surface area is 1.46 meters squared.  No Pain (0) Comment: Data Unavailable   No LMP recorded. Patient is postmenopausal.  Allergies reviewed: Yes  Medications reviewed: Yes    Medications: Medication refills not needed today.  Pharmacy name entered into [employer]: [id number] [place], [place] - [address]    Frailty Screening:   Is the patient here for a new oncology consult visit in cancer care? 2. No      Clinical concerns: None       Nga Marsh MA              [facility]     Hematology/Oncology return patient note    Today's Date:  October 15, 2024     Reason for Consult: Right breast cancer       HISTORY OF PRESENT ILLNESS: Krupa Mora is a 88 year old female who presents with the following oncology history    1.  Palpable lump in the right breast by PCP  2.Diagnostic mammogram and ultrasound showed a suspicious lesion in the right breast approximately 2 cm  3.  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  No significant family history of breast cancer  5.  Lumpectomy " on 3/8/2023 final pathology showed invasive ductal carcinoma with micropapillary features [place] grade 2 measuring 2 cm With associated DCIS grade  multiple foci of lymphovascular invasion margins negative  6  DEXA scan completed in 2019 showed a T score of -1.3  7 status post lumpectomy and radiation on surveillance alone    Krupa returns for follow-up today.   She feels well. Markers are normal has a left cervical neck strain        REVIEW OF SYSTEMS:   14 point ROS was reviewed and is negative other than as noted above in HPI.       HOME MEDICATIONS:  Current Outpatient Medications   Medication Sig Dispense Refill     atorvastatin (LIPITOR) 10 MG tablet Take 10 mg by mouth daily       calcium citrate-vitamin D (CITRACAL) 315-250 MG-UNIT TABS per tablet Take by mouth 2 times daily       Dimethicone 1.3 % CREA EXTERNALLY APPLY TOPICALLY 2 TIMES DAILY 92 g 1     ipratropium (ATROVENT) 0.06 % nasal spray Spray 2 sprays into both nostrils 4 times daily       Lidocaine-Menthol, Spray, 4-1 % LIQD Externally apply 1 spray topically 4 times daily as needed (radiation dermatitis) 118 mL 1     LORazepam (ATIVAN) 0.5 MG tablet Take 0.5 mg by mouth every 6 hours as needed for anxiety       MAGNESIUM GLYCINATE PO Take 2 tablets of 200mg each day       Sennosides (SENOKOT PO)        Simethicone (GAS-X PO)        triamcinolone (KENALOG) 0.1 % external cream Apply topically 2 times daily 30 g 1         ALLERGIES:  Allergies   Allergen Reactions     Pneumococcal 13-Miranda Conj Vacc Rash         PAST MEDICAL HISTORY:  Hyperlipidemia    PAST SURGICAL HISTORY:  Past Surgical History:   Procedure Laterality Date     LUMPECTOMY BREAST Right 3/8/2023    Procedure: Radio Frequency Identification localized right lumpectomy;  Surgeon: Basil Calvillo MD;  Location:  OR         SOCIAL HISTORY:  Social History     Socioeconomic History     Marital status:      Spouse name: Not on file     Number of children: Not on file      Years of education: Not on file     Highest education level: Not on file   Occupational History     Not on file   Tobacco Use     Smoking status: Never     Smokeless tobacco: Never   Vaping Use     Vaping status: Never Used   Substance and Sexual Activity     Alcohol use: Not Currently     Drug use: Never     Sexual activity: Not on file   Other Topics Concern     Parent/sibling w/ CABG, MI or angioplasty before 65F 55M? Not Asked   Social History Narrative     Not on file     Social Determinants of Health     Financial Resource Strain: Low Risk  (10/2/2022)    [facility] [employer] [facility], [facility]    Financial Resource Strain      Difficulty of Paying Living Expenses: 3      Difficulty of Paying Living Expenses: Not on file   Food Insecurity: No Food Insecurity (10/2/2022)    Received from [employer] [facility], [facility]    Food Insecurity      Worried About Running Out of Food in the Last Year: 1   Transportation Needs: No Transportation Needs (10/2/2022)    Received from [employer] [facility], [facility]    Transportation Needs      Lack of Transportation (Medical): 1   Physical Activity: Not on file   Stress: Not on file   Social Connections: Unknown (10/10/2023)    Received from [employer] [facility], [facility]    Social Connections      Frequency of Communication with Friends and Family: Not on file   Interpersonal Safety: Not on file   Housing Stability: Low Risk  (10/2/2022)    Received from [employer] [facility], [facility]    Housing Stability      Unable to Pay for Housing in the Last Year: 1         FAMILY HISTORY:  No Family history of breast cancer, ovarian cancer pancreatic cancer    PHYSICAL EXAM:  Vital  "signs:  BP (!) 150/78 (BP Location: Right arm, Patient Position: Sitting, Cuff Size: Adult Regular)   Pulse 87   Resp 16   Ht 1.575 m (5' 2\")   Wt 49 kg (108 lb)   SpO2 96%   BMI 19.75 kg/m     ECO  GENERAL/CONSTITUTIONAL: No acute distress.  EYES: Pupils are equal, round, and react to light and accommodation. Extraocular movements intact.  No scleral icterus.  ENT/MOUTH: Neck supple. Oropharynx clear, no mucositis.  LYMPH: No anterior cervical, posterior cervical, supraclavicular, axillary or inguinal adenopathy.   RESPIRATORY: Clear to auscultation bilaterally. No crackles or wheezing.   CARDIOVASCULAR: Regular rate and rhythm without murmurs, gallops, or rubs.  GASTROINTESTINAL: No hepatosplenomegaly, masses, or tenderness. The patient has normal bowel sounds. No guarding.  No distention.  MUSCULOSKELETAL: left cervical neck strain  NEUROLOGIC: Cranial nerves II-XII are intact. Alert, oriented, answers questions appropriately.  INTEGUMENTARY: No rashes or jaundice.  GAIT: Steady, does not use assistive device  Bilateral breast exam is normal        LABS:  noted    PATHOLOGY:  Right breast, lumpectomy:  --Invasive ductal carcinoma with micropapillary features, [place] grade 2 (of 3), 2.0 cm.  --Admixed ductal carcinoma in situ (DCIS), nuclear grade 2 (of 3).  --Multiple foci of lymphovascular invasion are identified.  --Invasive carcinoma is located 0.2 cm from the closest posterior and anterior margins.  --DCIS is located 0.3 cm from the closest posterior margin.    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  Breast cancer: Observation alone. Markers stable, recheck in 6 months per patient request I am ok with her going yearly    2 " dexa mild osteopenia T score of -1.4 observe     3 neck strain: right cervical neck. Refuses PT, will try magnesium lotion    Total time spent on day of visit, including review of tests, obtaining/reviewing separately obtained history, ordering medications/tests/procedures, communicating with PCP/consultants, and documenting in electronic medical record: 3minutes            Again, thank you for allowing me to participate in the care of your patient.        Sincerely,        Winston Kraft MD

## 2024-11-20 ENCOUNTER — APPOINTMENT (OUTPATIENT)
Dept: URBAN - METROPOLITAN AREA CLINIC 256 | Age: 89
Setting detail: DERMATOLOGY
End: 2024-11-21

## 2024-11-20 DIAGNOSIS — L82.0 INFLAMED SEBORRHEIC KERATOSIS: ICD-10-CM

## 2024-11-20 DIAGNOSIS — I83.9 ASYMPTOMATIC VARICOSE VEINS OF LOWER EXTREMITIES: ICD-10-CM

## 2024-11-20 DIAGNOSIS — L57.0 ACTINIC KERATOSIS: ICD-10-CM

## 2024-11-20 DIAGNOSIS — L85.8 OTHER SPECIFIED EPIDERMAL THICKENING: ICD-10-CM

## 2024-11-20 DIAGNOSIS — L82.1 OTHER SEBORRHEIC KERATOSIS: ICD-10-CM

## 2024-11-20 DIAGNOSIS — D22 MELANOCYTIC NEVI: ICD-10-CM

## 2024-11-20 DIAGNOSIS — D18.0 HEMANGIOMA: ICD-10-CM

## 2024-11-20 DIAGNOSIS — D485 NEOPLASM OF UNCERTAIN BEHAVIOR OF SKIN: ICD-10-CM

## 2024-11-20 DIAGNOSIS — L57.8 OTHER SKIN CHANGES DUE TO CHRONIC EXPOSURE TO NONIONIZING RADIATION: ICD-10-CM

## 2024-11-20 DIAGNOSIS — Z71.89 OTHER SPECIFIED COUNSELING: ICD-10-CM

## 2024-11-20 PROBLEM — D48.5 NEOPLASM OF UNCERTAIN BEHAVIOR OF SKIN: Status: ACTIVE | Noted: 2024-11-20

## 2024-11-20 PROBLEM — D22.72 MELANOCYTIC NEVI OF LEFT LOWER LIMB, INCLUDING HIP: Status: ACTIVE | Noted: 2024-11-20

## 2024-11-20 PROBLEM — D18.01 HEMANGIOMA OF SKIN AND SUBCUTANEOUS TISSUE: Status: ACTIVE | Noted: 2024-11-20

## 2024-11-20 PROBLEM — I83.93 ASYMPTOMATIC VARICOSE VEINS OF BILATERAL LOWER EXTREMITIES: Status: ACTIVE | Noted: 2024-11-20

## 2024-11-20 PROBLEM — D22.71 MELANOCYTIC NEVI OF RIGHT LOWER LIMB, INCLUDING HIP: Status: ACTIVE | Noted: 2024-11-20

## 2024-11-20 PROBLEM — D22.39 MELANOCYTIC NEVI OF OTHER PARTS OF FACE: Status: ACTIVE | Noted: 2024-11-20

## 2024-11-20 PROBLEM — D22.62 MELANOCYTIC NEVI OF LEFT UPPER LIMB, INCLUDING SHOULDER: Status: ACTIVE | Noted: 2024-11-20

## 2024-11-20 PROBLEM — D22.5 MELANOCYTIC NEVI OF TRUNK: Status: ACTIVE | Noted: 2024-11-20

## 2024-11-20 PROBLEM — D22.61 MELANOCYTIC NEVI OF RIGHT UPPER LIMB, INCLUDING SHOULDER: Status: ACTIVE | Noted: 2024-11-20

## 2024-11-20 PROCEDURE — 17003 DESTRUCT PREMALG LES 2-14: CPT | Mod: 59

## 2024-11-20 PROCEDURE — OTHER MONITORING: OTHER

## 2024-11-20 PROCEDURE — 17110 DESTRUCT B9 LESION 1-14: CPT

## 2024-11-20 PROCEDURE — OTHER MIPS QUALITY: OTHER

## 2024-11-20 PROCEDURE — OTHER LIQUID NITROGEN: OTHER

## 2024-11-20 PROCEDURE — OTHER PHOTO-DOCUMENTATION: OTHER

## 2024-11-20 PROCEDURE — 99213 OFFICE O/P EST LOW 20 MIN: CPT | Mod: 25

## 2024-11-20 PROCEDURE — 17000 DESTRUCT PREMALG LESION: CPT | Mod: 59

## 2024-11-20 PROCEDURE — OTHER COUNSELING: OTHER

## 2024-11-20 ASSESSMENT — LOCATION SIMPLE DESCRIPTION DERM
LOCATION SIMPLE: LEFT ANTERIOR NECK
LOCATION SIMPLE: RIGHT CHEEK
LOCATION SIMPLE: NOSE
LOCATION SIMPLE: LEFT POSTERIOR THIGH
LOCATION SIMPLE: RIGHT FOREHEAD
LOCATION SIMPLE: LEFT FOREARM
LOCATION SIMPLE: RIGHT UPPER ARM
LOCATION SIMPLE: LEFT THIGH
LOCATION SIMPLE: LEFT UPPER ARM
LOCATION SIMPLE: LEFT CHEEK
LOCATION SIMPLE: LEFT PRETIBIAL REGION
LOCATION SIMPLE: ABDOMEN
LOCATION SIMPLE: LEFT LOWER BACK
LOCATION SIMPLE: RIGHT FOREARM
LOCATION SIMPLE: RIGHT PRETIBIAL REGION
LOCATION SIMPLE: RIGHT THIGH
LOCATION SIMPLE: RIGHT POSTERIOR THIGH

## 2024-11-20 ASSESSMENT — LOCATION DETAILED DESCRIPTION DERM
LOCATION DETAILED: LEFT CENTRAL MALAR CHEEK
LOCATION DETAILED: LEFT INFERIOR MEDIAL MIDBACK
LOCATION DETAILED: RIGHT DISTAL POSTERIOR UPPER ARM
LOCATION DETAILED: LEFT DISTAL POSTERIOR THIGH
LOCATION DETAILED: RIGHT ANTERIOR DISTAL THIGH
LOCATION DETAILED: LEFT LATERAL ABDOMEN
LOCATION DETAILED: LEFT ANTERIOR PROXIMAL THIGH
LOCATION DETAILED: LEFT DISTAL PRETIBIAL REGION
LOCATION DETAILED: LEFT DISTAL POSTERIOR UPPER ARM
LOCATION DETAILED: LEFT INFERIOR CENTRAL MALAR CHEEK
LOCATION DETAILED: RIGHT DISTAL POSTERIOR THIGH
LOCATION DETAILED: LEFT INFERIOR NASAL CHEEK
LOCATION DETAILED: RIGHT INFERIOR CENTRAL MALAR CHEEK
LOCATION DETAILED: LEFT SUPERIOR LATERAL NECK
LOCATION DETAILED: LEFT INFERIOR MEDIAL MALAR CHEEK
LOCATION DETAILED: RIGHT VENTRAL DISTAL FOREARM
LOCATION DETAILED: LEFT VENTRAL PROXIMAL FOREARM
LOCATION DETAILED: RIGHT DISTAL PRETIBIAL REGION
LOCATION DETAILED: NASAL DORSUM
LOCATION DETAILED: RIGHT SUPERIOR MEDIAL FOREHEAD

## 2024-11-20 ASSESSMENT — LOCATION ZONE DERM
LOCATION ZONE: NECK
LOCATION ZONE: FACE
LOCATION ZONE: ARM
LOCATION ZONE: NOSE
LOCATION ZONE: LEG
LOCATION ZONE: TRUNK

## 2024-11-20 NOTE — PROCEDURE: MONITORING
Detail Level: Detailed
Patient Specific Counseling (Will Not Stick From Patient To Patient): -Discussed doing a biopsy today vs waiting, pt would like to monitor the lesion and recheck in 6 months. \\n-Questionable early Basal Cell Carcinoma RTC 6 months.

## 2025-02-17 ENCOUNTER — HOSPITAL ENCOUNTER (OUTPATIENT)
Dept: MAMMOGRAPHY | Facility: CLINIC | Age: OVER 89
Discharge: HOME | End: 2025-02-17
Attending: INTERNAL MEDICINE | Admitting: INTERNAL MEDICINE
Payer: MEDICARE

## 2025-02-17 DIAGNOSIS — Z12.31 ENCOUNTER FOR SCREENING MAMMOGRAM FOR MALIGNANT NEOPLASM OF BREAST: ICD-10-CM

## 2025-02-17 DIAGNOSIS — C50.612 MALIGNANT NEOPLASM OF AXILLARY TAIL OF LEFT BREAST IN FEMALE, ESTROGEN RECEPTOR POSITIVE (H): ICD-10-CM

## 2025-02-17 DIAGNOSIS — R97.8 ELEVATED TUMOR MARKERS: ICD-10-CM

## 2025-02-17 DIAGNOSIS — Z17.0 MALIGNANT NEOPLASM OF AXILLARY TAIL OF LEFT BREAST IN FEMALE, ESTROGEN RECEPTOR POSITIVE (H): ICD-10-CM

## 2025-02-17 PROCEDURE — 77063 BREAST TOMOSYNTHESIS BI: CPT

## 2025-03-22 ENCOUNTER — HEALTH MAINTENANCE LETTER (OUTPATIENT)
Age: OVER 89
End: 2025-03-22

## 2025-04-08 ENCOUNTER — LAB (OUTPATIENT)
Dept: INFUSION THERAPY | Facility: CLINIC | Age: OVER 89
End: 2025-04-08
Attending: INTERNAL MEDICINE
Payer: MEDICARE

## 2025-04-08 DIAGNOSIS — Z17.0 MALIGNANT NEOPLASM OF AXILLARY TAIL OF LEFT BREAST IN FEMALE, ESTROGEN RECEPTOR POSITIVE (H): ICD-10-CM

## 2025-04-08 DIAGNOSIS — C50.612 MALIGNANT NEOPLASM OF AXILLARY TAIL OF LEFT BREAST IN FEMALE, ESTROGEN RECEPTOR POSITIVE (H): ICD-10-CM

## 2025-04-08 DIAGNOSIS — R97.8 ELEVATED TUMOR MARKERS: ICD-10-CM

## 2025-04-08 LAB
ALBUMIN SERPL BCG-MCNC: 4.3 G/DL (ref 3.5–5.2)
ALP SERPL-CCNC: 114 U/L (ref 40–150)
ALT SERPL W P-5'-P-CCNC: 24 U/L (ref 0–50)
ANION GAP SERPL CALCULATED.3IONS-SCNC: 10 MMOL/L (ref 7–15)
AST SERPL W P-5'-P-CCNC: 27 U/L (ref 0–45)
BASOPHILS # BLD AUTO: 0 10E3/UL (ref 0–0.2)
BASOPHILS NFR BLD AUTO: 0 %
BILIRUB SERPL-MCNC: 0.5 MG/DL
BUN SERPL-MCNC: 21.6 MG/DL (ref 8–23)
CALCIUM SERPL-MCNC: 10 MG/DL (ref 8.8–10.4)
CHLORIDE SERPL-SCNC: 104 MMOL/L (ref 98–107)
CREAT SERPL-MCNC: 0.67 MG/DL (ref 0.51–0.95)
EGFRCR SERPLBLD CKD-EPI 2021: 83 ML/MIN/1.73M2
EOSINOPHIL # BLD AUTO: 0 10E3/UL (ref 0–0.7)
EOSINOPHIL NFR BLD AUTO: 0 %
ERYTHROCYTE [DISTWIDTH] IN BLOOD BY AUTOMATED COUNT: 13 % (ref 10–15)
GLUCOSE SERPL-MCNC: 97 MG/DL (ref 70–99)
HCO3 SERPL-SCNC: 27 MMOL/L (ref 22–29)
HCT VFR BLD AUTO: 45.1 % (ref 35–47)
HGB BLD-MCNC: 15.3 G/DL (ref 11.7–15.7)
IMM GRANULOCYTES # BLD: 0 10E3/UL
IMM GRANULOCYTES NFR BLD: 0 %
LYMPHOCYTES # BLD AUTO: 1.2 10E3/UL (ref 0.8–5.3)
LYMPHOCYTES NFR BLD AUTO: 16 %
MCH RBC QN AUTO: 31.5 PG (ref 26.5–33)
MCHC RBC AUTO-ENTMCNC: 33.9 G/DL (ref 31.5–36.5)
MCV RBC AUTO: 93 FL (ref 78–100)
MONOCYTES # BLD AUTO: 0.8 10E3/UL (ref 0–1.3)
MONOCYTES NFR BLD AUTO: 11 %
NEUTROPHILS # BLD AUTO: 5.3 10E3/UL (ref 1.6–8.3)
NEUTROPHILS NFR BLD AUTO: 72 %
NRBC # BLD AUTO: 0 10E3/UL
NRBC BLD AUTO-RTO: 0 /100
PLATELET # BLD AUTO: 246 10E3/UL (ref 150–450)
POTASSIUM SERPL-SCNC: 4.2 MMOL/L (ref 3.4–5.3)
PROT SERPL-MCNC: 7.4 G/DL (ref 6.4–8.3)
RBC # BLD AUTO: 4.86 10E6/UL (ref 3.8–5.2)
SODIUM SERPL-SCNC: 141 MMOL/L (ref 135–145)
WBC # BLD AUTO: 7.4 10E3/UL (ref 4–11)

## 2025-04-08 PROCEDURE — 85041 AUTOMATED RBC COUNT: CPT | Performed by: INTERNAL MEDICINE

## 2025-04-08 PROCEDURE — 86300 IMMUNOASSAY TUMOR CA 15-3: CPT | Performed by: INTERNAL MEDICINE

## 2025-04-08 PROCEDURE — 82378 CARCINOEMBRYONIC ANTIGEN: CPT | Performed by: INTERNAL MEDICINE

## 2025-04-08 PROCEDURE — 36415 COLL VENOUS BLD VENIPUNCTURE: CPT

## 2025-04-08 PROCEDURE — 84155 ASSAY OF PROTEIN SERUM: CPT | Performed by: INTERNAL MEDICINE

## 2025-04-08 PROCEDURE — 84460 ALANINE AMINO (ALT) (SGPT): CPT | Performed by: INTERNAL MEDICINE

## 2025-04-08 PROCEDURE — 85004 AUTOMATED DIFF WBC COUNT: CPT | Performed by: INTERNAL MEDICINE

## 2025-04-09 LAB
CANCER AG15-3 SERPL-ACNC: 29 U/ML
CEA SERPL-MCNC: 4.7 NG/ML

## 2025-04-15 ENCOUNTER — ONCOLOGY VISIT (OUTPATIENT)
Dept: ONCOLOGY | Facility: CLINIC | Age: OVER 89
End: 2025-04-15
Attending: INTERNAL MEDICINE
Payer: MEDICARE

## 2025-04-15 VITALS
RESPIRATION RATE: 16 BRPM | OXYGEN SATURATION: 97 % | SYSTOLIC BLOOD PRESSURE: 138 MMHG | HEART RATE: 92 BPM | BODY MASS INDEX: 20.06 KG/M2 | HEIGHT: 62 IN | DIASTOLIC BLOOD PRESSURE: 83 MMHG | WEIGHT: 109 LBS

## 2025-04-15 DIAGNOSIS — C50.612 MALIGNANT NEOPLASM OF AXILLARY TAIL OF LEFT BREAST IN FEMALE, ESTROGEN RECEPTOR POSITIVE (H): Primary | ICD-10-CM

## 2025-04-15 DIAGNOSIS — Z17.0 MALIGNANT NEOPLASM OF AXILLARY TAIL OF LEFT BREAST IN FEMALE, ESTROGEN RECEPTOR POSITIVE (H): Primary | ICD-10-CM

## 2025-04-15 PROCEDURE — G0463 HOSPITAL OUTPT CLINIC VISIT: HCPCS | Performed by: INTERNAL MEDICINE

## 2025-04-15 PROCEDURE — 99215 OFFICE O/P EST HI 40 MIN: CPT | Performed by: INTERNAL MEDICINE

## 2025-04-15 PROCEDURE — G2211 COMPLEX E/M VISIT ADD ON: HCPCS | Performed by: INTERNAL MEDICINE

## 2025-04-15 ASSESSMENT — PAIN SCALES - GENERAL: PAINLEVEL_OUTOF10: NO PAIN (0)

## 2025-04-15 NOTE — PROGRESS NOTES
"Oncology Rooming Note    April 15, 2025 3:07 PM   Krupa Mora is a 89 year old female who presents for:    Chief Complaint   Patient presents with    Oncology Clinic Visit     Initial Vitals: /83 (BP Location: Left arm, Patient Position: Sitting, Cuff Size: Adult Regular)   Pulse 92   Resp 16   Ht 1.575 m (5' 2\")   Wt 49.4 kg (109 lb)   SpO2 97%   BMI 19.94 kg/m   Estimated body mass index is 19.94 kg/m  as calculated from the following:    Height as of this encounter: 1.575 m (5' 2\").    Weight as of this encounter: 49.4 kg (109 lb). Body surface area is 1.47 meters squared.  No Pain (0) Comment: Data Unavailable   No LMP recorded. Patient is postmenopausal.  Allergies reviewed: Yes  Medications reviewed: Yes    Medications: Medication refills not needed today.  Pharm[facility] [id number] [place] [address]    Frailty Screening:   Is the patient here for a new oncology consult visit in cancer care? 2. No    PHQ9:  Did this patient require a PHQ9?: No Provider ready to see patient so questions were not asked.      Clinical concerns: None       Nga Marsh MA              "

## 2025-04-15 NOTE — PROGRESS NOTES
[facility]     Hematology/Oncology return patient note    Today's Date:  April 15, 2025     Reason for Consult: Right breast cancer       HISTORY OF PRESENT ILLNESS: Krupa Mora is a 89 year old female who presents with the following oncology history    1.  Palpable lump in the right breast by PCP  2.Diagnostic mammogram and ultrasound showed a suspicious lesion in the right breast approximately 2 cm  3.  Biopsy obtained she was found to have micropapillary carcinoma ER/MT positive HER2/logan negative lymph node involvement  4.  No significant family history of breast cancer  5.  Lumpectomy on 3/8/2023 final pathology showed invasive ductal carcinoma with micropapillary features [place] grade 2 measuring 2 cm With associated DCIS grade  multiple foci of lymphovascular invasion margins negative  6  DEXA scan completed in 2019 showed a T score of -1.3  7 status post lumpectomy and radiation on surveillance alone    Krupa returns for follow-up today.   She feels well. Markers are normal . Mammogram is normal in feb 2025        REVIEW OF SYSTEMS:   14 point ROS was reviewed and is negative other than as noted above in HPI.       HOME MEDICATIONS:  Current Outpatient Medications   Medication Sig Dispense Refill    atorvastatin (LIPITOR) 10 MG tablet Take 10 mg by mouth daily      calcium citrate-vitamin D (CITRACAL) 315-250 MG-UNIT TABS per tablet Take by mouth 2 times daily      Dimethicone 1.3 % CREA EXTERNALLY APPLY TOPICALLY 2 TIMES DAILY 92 g 1    ipratropium (ATROVENT) 0.06 % nasal spray Spray 2 sprays into both nostrils 4 times daily      Lidocaine-Menthol, Spray, 4-1 % LIQD Externally apply 1 spray topically 4 times daily as needed (radiation dermatitis) 118 mL 1    LORazepam (ATIVAN) 0.5 MG tablet Take 0.5 mg by mouth every 6 hours as needed for anxiety      MAGNESIUM GLYCINATE PO Take 2 tablets of 200mg each day      Sennosides (SENOKOT PO)       Simethicone (GAS-X PO)       triamcinolone  (KENALOG) 0.1 % external cream Apply topically 2 times daily 30 g 1         ALLERGIES:  Allergies   Allergen Reactions    Pneumococcal 13-Miranda Conj Vacc Rash         PAST MEDICAL HISTORY:  Hyperlipidemia    PAST SURGICAL HISTORY:  Past Surgical History:   Procedure Laterality Date    LUMPECTOMY BREAST Right 3/8/2023    Procedure: Radio Frequency Identification localized right lumpectomy;  Surgeon: Basil Calvillo MD;  Location:  OR         SOCIAL HISTORY:  Social History     Socioeconomic History    Marital status:      Spouse name: Not on file    Number of children: Not on file    Years of education: Not on file    Highest education level: Not on file   Occupational History    Not on file   Tobacco Use    Smoking status: Never    Smokeless tobacco: Never   Vaping Use    Vaping status: Never Used   Substance and Sexual Activity    Alcohol use: Not Currently    Drug use: Never    Sexual activity: Not on file   Other Topics Concern    Parent/sibling w/ CABG, MI or angioplasty before 65F 55M? Not Asked   Social History Narrative    Not on file     Social Drivers of Health     Financial Resource Strain: Low Risk  (12/27/2024)    Received from [facility]    Financial Resource Strain     Difficulty of Paying Living Expenses: 3     Difficulty of Paying Living Expenses: Not on file   Food Insecurity: No Food Insecurity (12/27/2024)    Received from [facility]    Food Insecurity     Do you worry your food will run out before you are able to buy more?: 1   Transportation Needs: No Transportation Needs (12/27/2024)    Received from [facility]    Transportation Needs     Does lack of transportation keep you from medical appointments?: 1     Does lack of transportation keep you from work, meetings or getting things that you need?: 1   Physical Activity: Not on file   Stress: Not on file   Social Connections:  Socially Integrated (2024)    Received from [facility]    Social Connections     Do you often feel lonely or isolated from those around you?: 0   Interpersonal Safety: Not on file   Housing Stability: Low Risk  (2024)    Received from [facility]    Housing Stability     What is your housing situation today?: 1         FAMILY HISTORY:  No Family history of breast cancer, ovarian cancer pancreatic cancer    PHYSICAL EXAM:  Vital signs:    ECO  GENERAL/CONSTITUTIONAL: No acute distress.  EYES: Pupils are equal, round, and react to light and accommodation. Extraocular movements intact.  No scleral icterus.  ENT/MOUTH: Neck supple. Oropharynx clear, no mucositis.  LYMPH: No anterior cervical, posterior cervical, supraclavicular, axillary or inguinal adenopathy.   RESPIRATORY: Clear to auscultation bilaterally. No crackles or wheezing.   CARDIOVASCULAR: Regular rate and rhythm without murmurs, gallops, or rubs.  GASTROINTESTINAL: No hepatosplenomegaly, masses, or tenderness. The patient has normal bowel sounds. No guarding.  No distention.  MUSCULOSKELETAL: left cervical neck strain  NEUROLOGIC: Cranial nerves II-XII are intact. Alert, oriented, answers questions appropriately.  INTEGUMENTARY: No rashes or jaundice.  GAIT: Steady, does not use assistive device  Bilateral breast exam is normal  no adenopathy needed       LABS:  noted    PATHOLOGY:  Right breast, lumpectomy:  --Invasive ductal carcinoma with micropapillary features, [place] grade 2 (of 3), 2.0 cm.  --Admixed ductal carcinoma in situ (DCIS), nuclear grade 2 (of 3).  --Multiple foci of lymphovascular invasion are identified.  --Invasive carcinoma is located 0.2 cm from the closest posterior and anterior margins.  --DCIS is located 0.3 cm from the closest posterior margin.    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  Breast cancer: Observation alone. Markers stable, recheck in 6 months per patient request then may be we can go to yearly     2 dexa mild osteopenia T score of -1.4 observe     3 neck strain: right cervical neck. Magnesium lotion     Total time spent on day of visit, including review of tests, obtaining/reviewing separately obtained history, ordering medications/tests/procedures, communicating with PCP/consultants, and documenting in electronic medical record: 40 minutes

## 2025-04-15 NOTE — LETTER
4/15/2025      Krupa Mora  [address]  [facility] [address]      Dear Colleague,    Thank you for referring your patient, Krupa Mora, to the [facility]. Please see a copy of my visit note below.    [facility]     Hematology/Oncology return patient note    Today's Date:  April 15, 2025     Reason for Consult: Right breast cancer       HISTORY OF PRESENT ILLNESS: Krupa Mora is a 89 year old female who presents with the following oncology history    1.  Palpable lump in the right breast by PCP  2.Diagnostic mammogram and ultrasound showed a suspicious lesion in the right breast approximately 2 cm  3.  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  No significant family history of breast cancer  5.  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   She feels well. Markers are normal . Mammogram is normal in feb 2025        REVIEW OF SYSTEMS:   14 point ROS was reviewed and is negative other than as noted above in HPI.       HOME MEDICATIONS:  Current Outpatient Medications   Medication Sig Dispense Refill     atorvastatin (LIPITOR) 10 MG tablet Take 10 mg by mouth daily       calcium citrate-vitamin D (CITRACAL) 315-250 MG-UNIT TABS per tablet Take by mouth 2 times daily       Dimethicone 1.3 % CREA EXTERNALLY APPLY TOPICALLY 2 TIMES DAILY 92 g 1     ipratropium (ATROVENT) 0.06 % nasal spray Spray 2 sprays into both nostrils 4 times daily       Lidocaine-Menthol, Spray, 4-1 % LIQD Externally apply 1 spray topically 4 times daily as needed  (radiation dermatitis) 118 mL 1     LORazepam (ATIVAN) 0.5 MG tablet Take 0.5 mg by mouth every 6 hours as needed for anxiety       MAGNESIUM GLYCINATE PO Take 2 tablets of 200mg each day       Sennosides (SENOKOT PO)        Simethicone (GAS-X PO)        triamcinolone (KENALOG) 0.1 % external cream Apply topically 2 times daily 30 g 1         ALLERGIES:  Allergies   Allergen Reactions     Pneumococcal 13-Miranda Conj Vacc Rash         PAST MEDICAL HISTORY:  Hyperlipidemia    PAST SURGICAL HISTORY:  Past Surgical History:   Procedure Laterality Date     LUMPECTOMY BREAST Right 3/8/2023    Procedure: Radio Frequency Identification localized right lumpectomy;  Surgeon: Basil Calvillo MD;  Location:  OR         SOCIAL HISTORY:  Social History     Socioeconomic History     Marital status:      Spouse name: Not on file     Number of children: Not on file     Years of education: Not on file     Highest education level: Not on file   Occupational History     Not on file   Tobacco Use     Smoking status: Never     Smokeless tobacco: Never   Vaping Use     Vaping status: Never Used   Substance and Sexual Activity     Alcohol use: Not Currently     Drug use: Never     Sexual activity: Not on file   Other Topics Concern     Parent/sibling w/ CABG, MI or angioplasty before 65F 55M? Not Asked   Social History Narrative     Not on file     Social Drivers of Health     Financial Resource Strain: Low Risk  (12/27/2024)    Received from [facility]    Financial Resource Strain      Difficulty of Paying Living Expenses: 3      Difficulty of Paying Living Expenses: Not on file   Food Insecurity: No Food Insecurity (12/27/2024)    Received from [facility]    Food Insecurity      Do you worry your food will run out before you are able to buy more?: 1   Transportation Needs: No Transportation Needs (12/27/2024)    Received from [facility]    Transportation Needs      Does lack of transportation keep you from medical appointments?: 1      Does lack of transportation keep you from work, meetings or getting things that you need?: 1   Physical Activity: Not on file   Stress: Not on file   Social Connections: Socially Integrated (2024)    Received from [facility]    Social Connections      Do you often feel lonely or isolated from those around you?: 0   Interpersonal Safety: Not on file   Housing Stability: Low Risk  (2024)    Received from [facility]    Housing Stability      What is your housing situation today?: 1         FAMILY HISTORY:  No Family history of breast cancer, ovarian cancer pancreatic cancer    PHYSICAL EXAM:  Vital signs:    ECO  GENERAL/CONSTITUTIONAL: No acute distress.  EYES: Pupils are equal, round, and react to light and accommodation. Extraocular movements intact.  No scleral icterus.  ENT/MOUTH: Neck supple. Oropharynx clear, no mucositis.  LYMPH: No anterior cervical, posterior cervical, supraclavicular, axillary or inguinal adenopathy.   RESPIRATORY: Clear to auscultation bilaterally. No crackles or wheezing.   CARDIOVASCULAR: Regular rate and rhythm without murmurs, gallops, or rubs.  GASTROINTESTINAL: No hepatosplenomegaly, masses, or tenderness. The patient has normal bowel sounds. No guarding.  No distention.  MUSCULOSKELETAL: left cervical neck strain  NEUROLOGIC: Cranial nerves II-XII are intact. Alert, oriented, answers questions appropriately.  INTEGUMENTARY: No rashes or jaundice.  GAIT: Steady, does not use assistive device  Bilateral breast exam is normal  no adenopathy needed       LABS:  noted    PATHOLOGY:  Right breast, lumpectomy:  --Invasive ductal carcinoma with micropapillary features, [place] grade 2 (of 3), 2.0 cm.  --Admixed ductal carcinoma in situ (DCIS), nuclear grade 2 (of 3).  --Multiple foci of  "lymphovascular invasion are identified.  --Invasive carcinoma is located 0.2 cm from the closest posterior and anterior margins.  --DCIS is located 0.3 cm from the closest posterior margin.    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  Breast cancer: Observation alone. Markers stable, recheck in 6 months per patient request then may be we can go to yearly     2 dexa mild osteopenia T score of -1.4 observe     3 neck strain: right cervical neck. Magnesium lotion     Total time spent on day of visit, including review of tests, obtaining/reviewing separately obtained history, ordering medications/tests/procedures, communicating with PCP/consultants, and documenting in electronic medical record: 40 minutes            Oncology Rooming Note    April 15, 2025 3:07 PM   Krupa Mora is a 89 year old female who presents for:    Chief Complaint   Patient presents with     Oncology Clinic Visit     Initial Vitals: /83 (BP Location: Left arm, Patient Position: Sitting, Cuff Size: Adult Regular)   Pulse 92   Resp 16   Ht 1.575 m (5' 2\")   Wt 49.4 kg (109 lb)   SpO2 97%   BMI 19.94 kg/m   Estimated body mass index is 19.94 kg/m  as calculated from the following:    Height as of this encounter: 1.575 m (5' 2\").    Weight as of this encounter: 49.4 kg (109 lb). Body surface area is 1.47 meters squared.  No Pain (0) Comment: Data Unavailable   No LMP recorded. Patient is postmenopausal.  Allergies reviewed: Yes  Medications reviewed: Yes    Medications: Medication refills not needed today.  Pharmacy name entered into [employer]: [id number] [place] [address]    Frailty Screening:   Is the patient here for a new oncology consult visit in " cancer care? 2. No    PHQ9:  Did this patient require a PHQ9?: No Provider ready to see patient so questions were not asked.      Clinical concerns: None       Nga Marsh MA                Again, thank you for allowing me to participate in the care of your patient.        Sincerely,        Winston Kraft MD    Electronically signed

## 2025-04-29 ENCOUNTER — APPOINTMENT (OUTPATIENT)
Dept: URBAN - METROPOLITAN AREA CLINIC 256 | Age: OVER 89
Setting detail: DERMATOLOGY
End: 2025-04-30

## 2025-04-29 DIAGNOSIS — D22 MELANOCYTIC NEVI: ICD-10-CM

## 2025-04-29 DIAGNOSIS — L82.1 OTHER SEBORRHEIC KERATOSIS: ICD-10-CM

## 2025-04-29 DIAGNOSIS — L85.3 XEROSIS CUTIS: ICD-10-CM

## 2025-04-29 DIAGNOSIS — D18.0 HEMANGIOMA: ICD-10-CM

## 2025-04-29 DIAGNOSIS — L57.0 ACTINIC KERATOSIS: ICD-10-CM

## 2025-04-29 DIAGNOSIS — Z71.89 OTHER SPECIFIED COUNSELING: ICD-10-CM

## 2025-04-29 DIAGNOSIS — L57.8 OTHER SKIN CHANGES DUE TO CHRONIC EXPOSURE TO NONIONIZING RADIATION: ICD-10-CM

## 2025-04-29 PROBLEM — D22.39 MELANOCYTIC NEVI OF OTHER PARTS OF FACE: Status: ACTIVE | Noted: 2025-04-29

## 2025-04-29 PROBLEM — D22.61 MELANOCYTIC NEVI OF RIGHT UPPER LIMB, INCLUDING SHOULDER: Status: ACTIVE | Noted: 2025-04-29

## 2025-04-29 PROBLEM — C44.91 BASAL CELL CARCINOMA OF SKIN, UNSPECIFIED: Status: ACTIVE | Noted: 2025-04-29

## 2025-04-29 PROBLEM — D18.01 HEMANGIOMA OF SKIN AND SUBCUTANEOUS TISSUE: Status: ACTIVE | Noted: 2025-04-29

## 2025-04-29 PROBLEM — D22.5 MELANOCYTIC NEVI OF TRUNK: Status: ACTIVE | Noted: 2025-04-29

## 2025-04-29 PROBLEM — D22.71 MELANOCYTIC NEVI OF RIGHT LOWER LIMB, INCLUDING HIP: Status: ACTIVE | Noted: 2025-04-29

## 2025-04-29 PROBLEM — D22.62 MELANOCYTIC NEVI OF LEFT UPPER LIMB, INCLUDING SHOULDER: Status: ACTIVE | Noted: 2025-04-29

## 2025-04-29 PROBLEM — D22.72 MELANOCYTIC NEVI OF LEFT LOWER LIMB, INCLUDING HIP: Status: ACTIVE | Noted: 2025-04-29

## 2025-04-29 PROCEDURE — OTHER PHOTO-DOCUMENTATION: OTHER

## 2025-04-29 PROCEDURE — OTHER ADDITIONAL NOTES: OTHER

## 2025-04-29 PROCEDURE — 17000 DESTRUCT PREMALG LESION: CPT

## 2025-04-29 PROCEDURE — OTHER PATIENT SPECIFIC COUNSELING: OTHER

## 2025-04-29 PROCEDURE — 99213 OFFICE O/P EST LOW 20 MIN: CPT | Mod: 25

## 2025-04-29 PROCEDURE — OTHER DEFER: OTHER

## 2025-04-29 PROCEDURE — OTHER COUNSELING: OTHER

## 2025-04-29 PROCEDURE — OTHER MIPS QUALITY: OTHER

## 2025-04-29 PROCEDURE — OTHER LIQUID NITROGEN: OTHER

## 2025-04-29 ASSESSMENT — LOCATION ZONE DERM
LOCATION ZONE: FACE
LOCATION ZONE: LEG
LOCATION ZONE: TRUNK
LOCATION ZONE: ARM
LOCATION ZONE: NOSE

## 2025-04-29 ASSESSMENT — LOCATION SIMPLE DESCRIPTION DERM
LOCATION SIMPLE: NOSE
LOCATION SIMPLE: LEFT CHEEK
LOCATION SIMPLE: RIGHT POSTERIOR THIGH
LOCATION SIMPLE: LEFT FOREHEAD
LOCATION SIMPLE: CHEST
LOCATION SIMPLE: LEFT THIGH
LOCATION SIMPLE: RIGHT THIGH
LOCATION SIMPLE: RIGHT FOREARM
LOCATION SIMPLE: LEFT UPPER ARM
LOCATION SIMPLE: RIGHT UPPER ARM
LOCATION SIMPLE: RIGHT CHEEK
LOCATION SIMPLE: LEFT POSTERIOR THIGH
LOCATION SIMPLE: LEFT FOREARM
LOCATION SIMPLE: ABDOMEN
LOCATION SIMPLE: LEFT LOWER BACK

## 2025-04-29 ASSESSMENT — LOCATION DETAILED DESCRIPTION DERM
LOCATION DETAILED: LEFT MEDIAL MALAR CHEEK
LOCATION DETAILED: RIGHT DISTAL POSTERIOR UPPER ARM
LOCATION DETAILED: LEFT VENTRAL PROXIMAL FOREARM
LOCATION DETAILED: LEFT INFERIOR MEDIAL FOREHEAD
LOCATION DETAILED: LEFT INFERIOR CENTRAL MALAR CHEEK
LOCATION DETAILED: LEFT INFERIOR MEDIAL MIDBACK
LOCATION DETAILED: LEFT DISTAL POSTERIOR UPPER ARM
LOCATION DETAILED: RIGHT MEDIAL MALAR CHEEK
LOCATION DETAILED: RIGHT VENTRAL DISTAL FOREARM
LOCATION DETAILED: LEFT ANTERIOR PROXIMAL THIGH
LOCATION DETAILED: NASAL ROOT
LOCATION DETAILED: LEFT DISTAL POSTERIOR THIGH
LOCATION DETAILED: LEFT SUPERIOR NASAL CHEEK
LOCATION DETAILED: UPPER STERNUM
LOCATION DETAILED: RIGHT CENTRAL MALAR CHEEK
LOCATION DETAILED: RIGHT DISTAL POSTERIOR THIGH
LOCATION DETAILED: RIGHT ANTERIOR DISTAL THIGH
LOCATION DETAILED: LEFT LATERAL ABDOMEN

## 2025-04-29 NOTE — PROCEDURE: PATIENT SPECIFIC COUNSELING
Detail Level: Zone
- Advised patient to use heavy OTC moisturizers to help relieve symptoms\\n- Patient was agreeable

## 2025-04-29 NOTE — PROCEDURE: ADDITIONAL NOTES
Render Risk Assessment In Note?: no
Detail Level: Simple
Additional Notes: Patient declined treatment of lesions on face today due to upcoming events. She will RTC after events to have lesions treated.

## 2025-04-29 NOTE — PROCEDURE: LIQUID NITROGEN
Post-Care Instructions: I reviewed with the patient in detail post-care instructions. Patient is to wear sunprotection, and avoid picking at any of the treated lesions. Pt may apply Vaseline to crusted or scabbing areas.
Render Post-Care Instructions In Note?: no
Show Applicator Variable?: Yes
Consent: The patient's verbal consent was obtained including but not limited to risks of crusting, scabbing, blistering, scarring, darker or lighter pigmentary change, recurrence, incomplete removal and infection.
Duration Of Freeze Thaw-Cycle (Seconds): 3
Number Of Freeze-Thaw Cycles: 1 freeze-thaw cycle
Detail Level: Simple

## 2025-04-29 NOTE — HPI: FULL BODY SKIN EXAMINATION
What Type Of Note Output Would You Prefer (Optional)?: Standard Output
What Is The Reason For Today's Visit?: Full Body Skin Examination
What Is The Reason For Today's Visit? (Being Monitored For X): the development of new lesions
Additional History: Patient notes of various lesions she would like to point out during the exam.

## 2025-04-29 NOTE — PROCEDURE: DEFER
X Size Of Lesion In Cm (Optional): 0
Detail Level: Detailed
Procedure To Be Performed At Next Visit: Biopsy by shave method
Introduction Text (Please End With A Colon): The following procedure was deferred:
Procedure To Be Performed At Next Visit: Cryotherapy

## 2025-07-15 ENCOUNTER — APPOINTMENT (OUTPATIENT)
Dept: URBAN - METROPOLITAN AREA CLINIC 255 | Age: OVER 89
Setting detail: DERMATOLOGY
End: 2025-07-17

## 2025-07-15 PROBLEM — C44.319 BASAL CELL CARCINOMA OF SKIN OF OTHER PARTS OF FACE: Status: ACTIVE | Noted: 2025-07-15

## 2025-07-15 PROCEDURE — OTHER MOHS SURGERY: OTHER

## 2025-07-15 PROCEDURE — 13132 CMPLX RPR F/C/C/M/N/AX/G/H/F: CPT

## 2025-07-15 PROCEDURE — OTHER MIPS QUALITY: OTHER

## 2025-07-15 PROCEDURE — 17311 MOHS 1 STAGE H/N/HF/G: CPT

## 2025-07-22 ENCOUNTER — APPOINTMENT (OUTPATIENT)
Dept: URBAN - METROPOLITAN AREA CLINIC 255 | Age: OVER 89
Setting detail: DERMATOLOGY
End: 2025-07-23

## 2025-07-22 DIAGNOSIS — Z48.02 ENCOUNTER FOR REMOVAL OF SUTURES: ICD-10-CM

## 2025-07-22 PROCEDURE — OTHER SUTURE REMOVAL (GLOBAL PERIOD): OTHER

## 2025-07-22 PROCEDURE — OTHER RETURN TO REFERRING PROVIDER: OTHER

## 2025-07-22 PROCEDURE — OTHER COUNSELING: OTHER

## 2025-07-22 PROCEDURE — 99024 POSTOP FOLLOW-UP VISIT: CPT

## 2025-07-22 PROCEDURE — OTHER DIAGNOSIS COMMENT: OTHER

## 2025-07-22 PROCEDURE — OTHER MIPS QUALITY: OTHER

## 2025-07-22 ASSESSMENT — LOCATION ZONE DERM: LOCATION ZONE: FACE

## 2025-07-22 ASSESSMENT — LOCATION DETAILED DESCRIPTION DERM: LOCATION DETAILED: LEFT MEDIAL MALAR CHEEK

## 2025-07-22 ASSESSMENT — LOCATION SIMPLE DESCRIPTION DERM: LOCATION SIMPLE: LEFT CHEEK

## 2025-09-02 ENCOUNTER — APPOINTMENT (OUTPATIENT)
Dept: URBAN - METROPOLITAN AREA CLINIC 255 | Age: OVER 89
Setting detail: DERMATOLOGY
End: 2025-09-02

## 2025-09-02 DIAGNOSIS — R60.0 LOCALIZED EDEMA: ICD-10-CM

## 2025-09-02 PROCEDURE — OTHER MIPS QUALITY: OTHER

## 2025-09-02 PROCEDURE — OTHER COUNSELING: OTHER

## 2025-09-02 ASSESSMENT — LOCATION SIMPLE DESCRIPTION DERM: LOCATION SIMPLE: LEFT CHEEK

## 2025-09-02 ASSESSMENT — LOCATION ZONE DERM: LOCATION ZONE: FACE

## 2025-09-02 ASSESSMENT — LOCATION DETAILED DESCRIPTION DERM: LOCATION DETAILED: LEFT INFERIOR CENTRAL MALAR CHEEK

## (undated) DEVICE — NDL 19GA 1.5"

## (undated) DEVICE — ESU ELEC BLADE NN-STCK PLUMEPEN PRO 360D 10FT PLPRO4020

## (undated) DEVICE — PAD CHUX UNDERPAD 23X24" 7136

## (undated) DEVICE — DRSG GAUZE 4X4" 3033

## (undated) DEVICE — LINEN TOWEL PACK X5 5464

## (undated) DEVICE — MARKER MARGIN MARKER STD 6 COLOR SGL USE MMS6

## (undated) DEVICE — ESU ELEC BLADE 2.75" COATED/INSULATED E1455

## (undated) DEVICE — GLOVE BIOGEL PI MICRO INDICATOR UNDERGLOVE SZ 7.5 48975

## (undated) DEVICE — SOL WATER IRRIG 1000ML BOTTLE 2F7114

## (undated) DEVICE — NDL 25GA 1.5" 305127

## (undated) DEVICE — DRAPE BREAST/CHEST 29420

## (undated) DEVICE — ESU GROUND PAD UNIVERSAL W/O CORD

## (undated) DEVICE — PACK MINOR SBA15MIFSE

## (undated) DEVICE — SYR 10ML FINGER CONTROL W/O NDL 309695

## (undated) DEVICE — PREP CHLORAPREP 26ML TINTED HI-LITE ORANGE 930815

## (undated) DEVICE — SET BREAST BIOPSY LOCALIZER 20 PROBE 8MM PENCIL 09-0006

## (undated) DEVICE — SU VICRYL 4-0 PS-2 18" UND J496H

## (undated) DEVICE — SU VICRYL 3-0 SH 27" J316H

## (undated) DEVICE — GLOVE BIOGEL PI MICRO SZ 7.5 48575

## (undated) DEVICE — CLIP ETHICON LIGACLIP SM BLUE LT100

## (undated) DEVICE — SU SILK 2-0 FSL 18" 677G

## (undated) DEVICE — DRSG STERI STRIP 1/2X4" R1547

## (undated) RX ORDER — LIDOCAINE HYDROCHLORIDE 10 MG/ML
INJECTION, SOLUTION EPIDURAL; INFILTRATION; INTRACAUDAL; PERINEURAL
Status: DISPENSED
Start: 2023-03-08

## (undated) RX ORDER — PROPOFOL 10 MG/ML
INJECTION, EMULSION INTRAVENOUS
Status: DISPENSED
Start: 2023-03-08

## (undated) RX ORDER — FENTANYL CITRATE 50 UG/ML
INJECTION, SOLUTION INTRAMUSCULAR; INTRAVENOUS
Status: DISPENSED
Start: 2023-03-08

## (undated) RX ORDER — ONDANSETRON 2 MG/ML
INJECTION INTRAMUSCULAR; INTRAVENOUS
Status: DISPENSED
Start: 2023-03-08

## (undated) RX ORDER — CEFAZOLIN SODIUM/WATER 2 G/20 ML
SYRINGE (ML) INTRAVENOUS
Status: DISPENSED
Start: 2023-03-08

## (undated) RX ORDER — BUPIVACAINE HYDROCHLORIDE 5 MG/ML
INJECTION, SOLUTION EPIDURAL; INTRACAUDAL
Status: DISPENSED
Start: 2023-03-08